# Patient Record
Sex: FEMALE | Race: WHITE | NOT HISPANIC OR LATINO | Employment: UNEMPLOYED | URBAN - METROPOLITAN AREA
[De-identification: names, ages, dates, MRNs, and addresses within clinical notes are randomized per-mention and may not be internally consistent; named-entity substitution may affect disease eponyms.]

---

## 2023-04-25 NOTE — PROGRESS NOTES
Chief Complaint  Chief Complaint   Patient presents with   • Anxiety   • Back Pain   • COPD   • Depression   • Establish Care     New patient          Subjective          Geni Rea is here today to establish care. The following problems were discussed:       HLD-She reports sometimes it has been normal and sometimes it has been high. It fluctuates. She is currently not on any medications. She does not watch her diet.     GERD- Controlled on omeprazole.     Anxiety with depression- On medication. Feels like this is controlled for the most part. She previously saw a counselor in past.     Osteoporosis/chronic pain- Uses a cane daily. On fosamax and compliant. Goes to specialist in pain care, [place]. Chronic pain is lower/back hips area. Currently pain is 9/10. A lot of walking makes pain worse. Pain management helps some.     Asthma/COPD- Compliant on inhalers. She reports she gets some shortness of breath.     Seasonal allergies- Does not take any medication.     Vitamin D defiency- Have been diagnosed- had labs that go back and forth    Transgender-Male to female transgender. On shots and previously went through  of  endocrinology.      She lives in [place]   Previous PCP was Radha Serrano   Marital status- single  Children- No  Works as Unemployed   Exercise- irregularly  Diet- Eating too much junk food       Review of Systems   Constitutional: Negative for chills and fever.   HENT: Negative for congestion.    Eyes: Negative.    Respiratory: Positive for shortness of breath.    Cardiovascular: Negative for chest pain.   Gastrointestinal: Negative for abdominal pain, nausea and vomiting.   Genitourinary: Negative for difficulty urinating.   Musculoskeletal: Positive for arthralgias.   Skin: Negative.    Neurological: Negative for dizziness, light-headedness and headache.   Psychiatric/Behavioral: Positive for depressed mood. Negative for self-injury and suicidal ideas. The  "patient is nervous/anxious.         Objective   Vital Signs:   Vitals:    04/26/23 1315   BP: 130/77   Pulse: 65   Temp: 97.8 °F (36.6 °C)   SpO2: 98%      Estimated body mass index is 24.53 kg/m² as calculated from the following:    Height as of this encounter: 167.6 cm (66\").    Weight as of this encounter: 68.9 kg (152 lb).            Physical Exam  Vitals reviewed.   Constitutional:       Appearance: Normal appearance. She is normal weight.   HENT:      Head: Normocephalic and atraumatic.      Nose: Nose normal.   Eyes:      Extraocular Movements: Extraocular movements intact.      Conjunctiva/sclera: Conjunctivae normal.      Comments: Wears eye glasses    Cardiovascular:      Rate and Rhythm: Normal rate and regular rhythm.      Pulses: Normal pulses.      Heart sounds: Normal heart sounds.      Comments: S1, S2 audible    Pulmonary:      Effort: Pulmonary effort is normal.      Comments: On room air, diminished breath sounds noted throughout all lobes  Abdominal:      General: Abdomen is flat. Bowel sounds are normal.      Palpations: Abdomen is soft.   Musculoskeletal:         General: Normal range of motion.      Cervical back: Normal range of motion.   Skin:     General: Skin is warm and dry.      Comments: Tattoos bilateral arms   Neurological:      General: No focal deficit present.      Mental Status: She is alert and oriented to person, place, and time. Mental status is at baseline.   Psychiatric:         Mood and Affect: Mood normal.         Behavior: Behavior normal.         Thought Content: Thought content normal.         Judgment: Judgment normal.                Physical Exam   Result Review :             Procedures       Assessment and Plan     Diagnoses and all orders for this visit:    1. Seasonal allergic rhinitis, unspecified trigger (Primary)  Assessment & Plan:  Does not take any medication.       2. Hyperlipidemia, unspecified hyperlipidemia type  Assessment & Plan:  Previously on " atorvastatin   She reports sometimes it has been normal and sometimes it has been high. It fluctuates. She is currently not on any medications. She does not watch her diet.       3. Osteoporosis, unspecified osteoporosis type, unspecified pathological fracture presence  Assessment & Plan:  Continue Fosamax  Uses a cane daily. On fosamax and compliant. Goes to specialist in pain care, but [place]. Chronic pain is lower/back hips area. Currently pain is 9/10. A lot of walking makes pain worse. Pain management helps some.     Referral to pain management       4. Asthma, unspecified asthma severity, unspecified whether complicated, unspecified whether persistent  Assessment & Plan:  Continue albuterol and Symbicort    Compliant on inhalers. She reports she gets some shortness of breath.           5. Anxiety  Assessment & Plan:  Continue Vraylar and paxil    On medication. Feels like this is controlled for the most part. She previously saw a counselor in past.       6. Depressive disorder  Assessment & Plan:  Continue Vraylar and paxil    On medication. Feels like this is controlled for the most part. She previously saw a counselor in past. Denies SI or HI.      7. Tobacco user  Assessment & Plan:  Encourage cessation    Geni Rea  reports that she has been smoking cigarettes. She has a 18.00 pack-year smoking history. She has never used smokeless tobacco.. I have educated her on the risk of diseases from using tobacco products such as cancer, COPD and heart disease.     I advised her to quit and she is not willing to quit.    I spent 3  minutes counseling the patient.    Smokes 1/2 PPD           8. Chronic obstructive pulmonary disease, unspecified COPD type  Assessment & Plan:  Continue albuterol, spiriva, and Symbicort    Compliant on inhalers. She reports she gets some shortness of breath.           9. Gastroesophageal reflux disease without esophagitis  Assessment & Plan:  Controlled on  omeprazole.      10. Encounter to establish care    11. Arthritis  Assessment & Plan:  Uses a cane daily. On fosamax and compliant. Goes to specialist in pain care, but [place] a [place]. Chronic pain is lower/back hips area. Currently pain is 9/10. A lot of walking makes pain worse. Pain management helps some.     Referral to [facility]     Orders:  -     Ambulatory Referral to Pain Management    12. Vitamin D deficiency  Assessment & Plan:  Have been diagnosed- had labs that go back and forth      13. Transsexualism  Assessment & Plan:  Male to female transgender   On shots and previously went through [facility] endocr[facility]ology    Referral to endocrinology     Orders:  -     Ambulatory Referral to Endocrinology    Other orders  -     PARoxetine (PAXIL) 30 MG tablet; Take 1 tablet by mouth every night at bedtime.  Dispense: 90 tablet; Refill: 2  -     Cariprazine HCl (Vraylar) 1.5 MG capsule capsule; Take 1 capsule by mouth Daily.  Dispense: 90 capsule; Refill: 2  -     alendronate (FOSAMAX) 70 MG tablet; Take 1 tablet by mouth Every 7 (Seven) Days.  Dispense: 4 tablet; Refill: 11  -     albuterol sulfate  (90 Base) MCG/ACT inhaler; Inhale 2 puffs Every 4 (Four) Hours As Needed for Wheezing.  Dispense: 18 g; Refill: 3  -     methocarbamol (ROBAXIN) 750 MG tablet; Take 1 tablet by mouth 4 (Four) Times a Day As Needed for Muscle Spasms.  Dispense: 90 tablet; Refill: 2  -     ibuprofen (ADVIL,MOTRIN) 800 MG tablet; Take 1 tablet by mouth Every 12 (Twelve) Hours.  Dispense: 90 tablet; Refill: 2  -     omeprazole (priLOSEC) 40 MG capsule; Take 1 capsule by mouth Daily.  Dispense: 90 capsule; Refill: 3            Follow Up   Return in about 7 months (around 11/26/2023) for Recheck- lipid panel, vitamin D, possible annual physical- patient o verify with previous PCP.   Patient was given instructions and counseling regarding her condition or for health maintenance advice. Please see specific  information pulled into the AVS if appropriate.      no

## 2023-04-26 ENCOUNTER — OFFICE VISIT (OUTPATIENT)
Dept: FAMILY MEDICINE CLINIC | Facility: CLINIC | Age: 53
End: 2023-04-26
Payer: MEDICAID

## 2023-04-26 VITALS
BODY MASS INDEX: 24.43 KG/M2 | TEMPERATURE: 97.8 F | SYSTOLIC BLOOD PRESSURE: 130 MMHG | WEIGHT: 152 LBS | HEART RATE: 65 BPM | DIASTOLIC BLOOD PRESSURE: 77 MMHG | HEIGHT: 66 IN | OXYGEN SATURATION: 98 %

## 2023-04-26 DIAGNOSIS — M81.0 OSTEOPOROSIS, UNSPECIFIED OSTEOPOROSIS TYPE, UNSPECIFIED PATHOLOGICAL FRACTURE PRESENCE: ICD-10-CM

## 2023-04-26 DIAGNOSIS — J44.9 CHRONIC OBSTRUCTIVE PULMONARY DISEASE, UNSPECIFIED COPD TYPE: ICD-10-CM

## 2023-04-26 DIAGNOSIS — E78.5 HYPERLIPIDEMIA, UNSPECIFIED HYPERLIPIDEMIA TYPE: ICD-10-CM

## 2023-04-26 DIAGNOSIS — F32.A DEPRESSIVE DISORDER: ICD-10-CM

## 2023-04-26 DIAGNOSIS — E55.9 VITAMIN D DEFICIENCY: ICD-10-CM

## 2023-04-26 DIAGNOSIS — J45.909 ASTHMA, UNSPECIFIED ASTHMA SEVERITY, UNSPECIFIED WHETHER COMPLICATED, UNSPECIFIED WHETHER PERSISTENT: ICD-10-CM

## 2023-04-26 DIAGNOSIS — M19.90 ARTHRITIS: ICD-10-CM

## 2023-04-26 DIAGNOSIS — Z72.0 TOBACCO USER: ICD-10-CM

## 2023-04-26 DIAGNOSIS — Z76.89 ENCOUNTER TO ESTABLISH CARE: ICD-10-CM

## 2023-04-26 DIAGNOSIS — F41.9 ANXIETY: ICD-10-CM

## 2023-04-26 DIAGNOSIS — F64.0 TRANSSEXUALISM: ICD-10-CM

## 2023-04-26 DIAGNOSIS — J30.2 SEASONAL ALLERGIC RHINITIS, UNSPECIFIED TRIGGER: Primary | ICD-10-CM

## 2023-04-26 DIAGNOSIS — K21.9 GASTROESOPHAGEAL REFLUX DISEASE WITHOUT ESOPHAGITIS: ICD-10-CM

## 2023-04-26 PROBLEM — B19.10 TYPE B VIRAL HEPATITIS: Status: ACTIVE | Noted: 2021-07-23

## 2023-04-26 PROBLEM — K58.9 IRRITABLE BOWEL SYNDROME: Status: ACTIVE | Noted: 2020-12-07

## 2023-04-26 PROBLEM — F64.9 GENDER DYSPHORIA: Status: ACTIVE | Noted: 2019-12-05

## 2023-04-26 RX ORDER — PAROXETINE 30 MG/1
30 TABLET, FILM COATED ORAL
COMMUNITY
Start: 2023-04-10 | End: 2023-04-26 | Stop reason: SDUPTHER

## 2023-04-26 RX ORDER — ALENDRONATE SODIUM 70 MG/1
70 TABLET ORAL
COMMUNITY
Start: 2023-01-31 | End: 2023-04-26 | Stop reason: SDUPTHER

## 2023-04-26 RX ORDER — ALBUTEROL SULFATE 90 UG/1
2 AEROSOL, METERED RESPIRATORY (INHALATION) EVERY 4 HOURS PRN
Qty: 18 G | Refills: 3 | Status: SHIPPED | OUTPATIENT
Start: 2023-04-26

## 2023-04-26 RX ORDER — RANITIDINE 150 MG/1
150 TABLET ORAL
COMMUNITY
End: 2023-04-26

## 2023-04-26 RX ORDER — METHOCARBAMOL 750 MG/1
750 TABLET, FILM COATED ORAL 4 TIMES DAILY PRN
COMMUNITY
End: 2023-04-26 | Stop reason: SDUPTHER

## 2023-04-26 RX ORDER — OMEPRAZOLE 40 MG/1
40 CAPSULE, DELAYED RELEASE ORAL DAILY
COMMUNITY
End: 2023-04-26 | Stop reason: SDUPTHER

## 2023-04-26 RX ORDER — ESTRADIOL VALERATE 20 MG/ML
5 INJECTION INTRAMUSCULAR
COMMUNITY
Start: 2023-01-13 | End: 2023-07-12

## 2023-04-26 RX ORDER — CEFDINIR 300 MG/1
1 CAPSULE ORAL EVERY 12 HOURS SCHEDULED
COMMUNITY
Start: 2023-01-09 | End: 2023-04-26

## 2023-04-26 RX ORDER — ATORVASTATIN CALCIUM 10 MG/1
10 TABLET, FILM COATED ORAL DAILY
Qty: 30 TABLET | Refills: 11 | COMMUNITY
Start: 2023-01-13 | End: 2023-04-26

## 2023-04-26 RX ORDER — EMTRICITABINE AND TENOFOVIR DISOPROXIL FUMARATE 200; 300 MG/1; MG/1
1 TABLET, FILM COATED ORAL DAILY
COMMUNITY
End: 2023-04-26

## 2023-04-26 RX ORDER — PAROXETINE 30 MG/1
30 TABLET, FILM COATED ORAL
Qty: 90 TABLET | Refills: 2 | Status: SHIPPED | OUTPATIENT
Start: 2023-04-26

## 2023-04-26 RX ORDER — BUDESONIDE AND FORMOTEROL FUMARATE DIHYDRATE 160; 4.5 UG/1; UG/1
2 AEROSOL RESPIRATORY (INHALATION)
COMMUNITY

## 2023-04-26 RX ORDER — HYDROCODONE BITARTRATE AND ACETAMINOPHEN 10; 325 MG/1; MG/1
1 TABLET ORAL 3 TIMES DAILY PRN
COMMUNITY
Start: 2023-04-05

## 2023-04-26 RX ORDER — ALBUTEROL SULFATE 90 UG/1
2 AEROSOL, METERED RESPIRATORY (INHALATION)
COMMUNITY
End: 2023-04-26 | Stop reason: SDUPTHER

## 2023-04-26 RX ORDER — IBUPROFEN 800 MG/1
800 TABLET ORAL EVERY 12 HOURS SCHEDULED
Qty: 90 TABLET | Refills: 2 | Status: SHIPPED | OUTPATIENT
Start: 2023-04-26

## 2023-04-26 RX ORDER — NAPROXEN 500 MG/1
TABLET ORAL
COMMUNITY
End: 2023-04-26

## 2023-04-26 RX ORDER — UBIDECARENONE 75 MG
50 CAPSULE ORAL DAILY
COMMUNITY
End: 2023-04-26

## 2023-04-26 RX ORDER — UMECLIDINIUM BROMIDE AND VILANTEROL TRIFENATATE 62.5; 25 UG/1; UG/1
1 POWDER RESPIRATORY (INHALATION) DAILY
COMMUNITY
End: 2023-04-26

## 2023-04-26 RX ORDER — IBUPROFEN 800 MG/1
1 TABLET ORAL EVERY 12 HOURS SCHEDULED
COMMUNITY
Start: 2023-04-05 | End: 2023-04-26 | Stop reason: SDUPTHER

## 2023-04-26 RX ORDER — SERTRALINE HYDROCHLORIDE 100 MG/1
100 TABLET, FILM COATED ORAL DAILY
COMMUNITY
End: 2023-04-26

## 2023-04-26 RX ORDER — ALENDRONATE SODIUM 70 MG/1
70 TABLET ORAL
Qty: 4 TABLET | Refills: 11 | Status: SHIPPED | OUTPATIENT
Start: 2023-04-26 | End: 2024-04-25

## 2023-04-26 RX ORDER — ESTRADIOL 2 MG/1
3 TABLET ORAL
COMMUNITY

## 2023-04-26 RX ORDER — METHOCARBAMOL 750 MG/1
750 TABLET, FILM COATED ORAL 4 TIMES DAILY PRN
Qty: 90 TABLET | Refills: 2 | Status: SHIPPED | OUTPATIENT
Start: 2023-04-26

## 2023-04-26 RX ORDER — OMEPRAZOLE 40 MG/1
40 CAPSULE, DELAYED RELEASE ORAL DAILY
Qty: 90 CAPSULE | Refills: 3 | Status: SHIPPED | OUTPATIENT
Start: 2023-04-26

## 2023-04-26 RX ORDER — TIOTROPIUM BROMIDE AND OLODATEROL 3.124; 2.736 UG/1; UG/1
SPRAY, METERED RESPIRATORY (INHALATION)
COMMUNITY
End: 2023-04-26

## 2023-04-26 NOTE — ASSESSMENT & PLAN NOTE
Continue albuterol and Symbicort    Compliant on inhalers. She reports she gets some shortness of breath.

## 2023-04-26 NOTE — PATIENT INSTRUCTIONS
Re-filled medications  Referral to pain management [facility]  Referral to Endocrinology- Dr. Villalta

## 2023-04-26 NOTE — ASSESSMENT & PLAN NOTE
Continue albuterol, spiriva, and Symbicort    Compliant on inhalers. She reports she gets some shortness of breath.

## 2023-04-26 NOTE — ASSESSMENT & PLAN NOTE
Male to female transgender   On shots and previously went through [facility] endocrinology    Referral to endocrinology

## 2023-04-26 NOTE — ASSESSMENT & PLAN NOTE
Continue Vraylar and paxil    On medication. Feels like this is controlled for the most part. She previously saw a counselor in past.

## 2023-04-26 NOTE — ASSESSMENT & PLAN NOTE
Continue Vraylar and paxil    On medication. Feels like this is controlled for the most part. She previously saw a counselor in past. Denies SI or HI.

## 2023-04-26 NOTE — ASSESSMENT & PLAN NOTE
Previously on atorvastatin   She reports sometimes it has been normal and sometimes it has been high. It fluctuates. She is currently not on any medications. She does not watch her diet.

## 2023-04-26 NOTE — ASSESSMENT & PLAN NOTE
Continue Fosamax  Uses a cane daily. On fosamax and compliant. Goes to specialist in pain care, but [place] a [place]. Chronic pain is lower/back hips area. Currently pain is 9/10. A lot of walking makes pain worse. Pain management helps some.     Referral to pain management

## 2023-04-26 NOTE — ASSESSMENT & PLAN NOTE
Encourage cessation    eGni Rea  reports that she has been smoking cigarettes. She has a 18.00 pack-year smoking history. She has never used smokeless tobacco.. I have educated her on the risk of diseases from using tobacco products such as cancer, COPD and heart disease.     I advised her to quit and she is not willing to quit.    I spent 3  minutes counseling the patient.    Smokes 1/2 PPD

## 2023-05-08 ENCOUNTER — OFFICE VISIT (OUTPATIENT)
Dept: PAIN MEDICINE | Facility: CLINIC | Age: 53
End: 2023-05-08
Payer: MEDICAID

## 2023-05-08 ENCOUNTER — TELEPHONE (OUTPATIENT)
Dept: PAIN MEDICINE | Facility: CLINIC | Age: 53
End: 2023-05-08

## 2023-05-08 ENCOUNTER — HOSPITAL ENCOUNTER (OUTPATIENT)
Dept: GENERAL RADIOLOGY | Facility: HOSPITAL | Age: 53
Discharge: HOME | End: 2023-05-08
Payer: MEDICAID

## 2023-05-08 VITALS
DIASTOLIC BLOOD PRESSURE: 69 MMHG | RESPIRATION RATE: 16 BRPM | SYSTOLIC BLOOD PRESSURE: 124 MMHG | HEART RATE: 57 BPM | OXYGEN SATURATION: 96 %

## 2023-05-08 DIAGNOSIS — F11.20 NARCOTIC DEPENDENCE: ICD-10-CM

## 2023-05-08 DIAGNOSIS — M51.36 DDD (DEGENERATIVE DISC DISEASE), LUMBAR: ICD-10-CM

## 2023-05-08 DIAGNOSIS — M54.16 LUMBAR RADICULOPATHY: Primary | ICD-10-CM

## 2023-05-08 DIAGNOSIS — Z79.899 HIGH RISK MEDICATION USE: Primary | ICD-10-CM

## 2023-05-08 DIAGNOSIS — M54.16 LUMBAR RADICULOPATHY: ICD-10-CM

## 2023-05-08 PROCEDURE — 72100 X-RAY EXAM L-S SPINE 2/3 VWS: CPT

## 2023-05-08 PROCEDURE — 99204 OFFICE O/P NEW MOD 45 MIN: CPT | Performed by: STUDENT IN AN ORGANIZED HEALTH CARE EDUCATION/TRAINING PROGRAM

## 2023-05-08 PROCEDURE — 1160F RVW MEDS BY RX/DR IN RCRD: CPT | Performed by: STUDENT IN AN ORGANIZED HEALTH CARE EDUCATION/TRAINING PROGRAM

## 2023-05-08 PROCEDURE — 1159F MED LIST DOCD IN RCRD: CPT | Performed by: STUDENT IN AN ORGANIZED HEALTH CARE EDUCATION/TRAINING PROGRAM

## 2023-05-08 PROCEDURE — 1125F AMNT PAIN NOTED PAIN PRSNT: CPT | Performed by: STUDENT IN AN ORGANIZED HEALTH CARE EDUCATION/TRAINING PROGRAM

## 2023-05-08 RX ORDER — HYDROCODONE BITARTRATE AND ACETAMINOPHEN 10; 325 MG/1; MG/1
1 TABLET ORAL EVERY 8 HOURS PRN
Qty: 90 TABLET | Refills: 0 | Status: SHIPPED | OUTPATIENT
Start: 2023-05-08 | End: 2023-05-09 | Stop reason: SDUPTHER

## 2023-05-08 RX ORDER — METHOCARBAMOL 750 MG/1
750 TABLET, FILM COATED ORAL 4 TIMES DAILY PRN
Qty: 90 TABLET | Refills: 2 | Status: SHIPPED | OUTPATIENT
Start: 2023-05-08

## 2023-05-08 NOTE — TELEPHONE ENCOUNTER
Caller: Geni Rea    Relationship: Self    Best call back number:     Requested Prescriptions:   HYDROcodone-acetaminophen (NORCO)  MG per tablet        [facility]arm[facility] where [facility] be sent[facility]      Last office visit with prescribing clinician: 5/8/2023   Last telemedicine visit with prescribing clinician: 6/5/2023   Next office visit with prescribing clinician: 6/5/2023     Additional details provided by patient: PRIOR AUTH NEEDED    Does the patient have less than a 3 day supply:  [x] Yes  [] No    Would you like a call back once the refill request has been completed: [x] Yes [] No    If the office needs to give you a call back, can they leave a voicemail: [x] Yes [] No    Mohit Looney Rep   05/08/23 14:49 EDT

## 2023-05-08 NOTE — PROGRESS NOTES
CHIEF COMPLAINT  Chief Complaint   Patient presents with   • Arthritis     New patient referred for Arthritis Lower back  Treated w/Hydrocodone last dose was Thursday        Primary Care  Jaylyn Moscoso, LEONOR Sargent   Gein Rea is a 52 y.o. female  who presents to obtain narcotics.  She states that she has been taking narcotics for many years for chronic pain.  She reports she has a significant history of osteoarthritis as well as facet arthropathy and bilateral hip pain.  She reports she has tried physical therapy and interventions in the past which were not effective.  She gets minimal benefit with narcotics.  She has no imaging.  She recently moved and is transferring care.    History of Present Illness     Location: Low back, hips  Onset: Years ago  Duration: Worsening  Timing: Constant throughout the day  Quality: Sharp, stabbing  Severity: Today: 10       Last Week: 10       Worst: 10  Modifying Factors: The pain is worse with movement and physical activity and slightly improved with narcotics  Functional Deficit: The pain makes it difficult for her to work perform her activities of daily living    Physical Therapy: no    Interval Update 05/08/2023:     The following portions of the patient's history were reviewed and updated as appropriate: allergies, current medications, past family history, past medical history, past social history, past surgical history and problem list.      Current Outpatient Medications:   •  albuterol sulfate  (90 Base) MCG/ACT inhaler, Inhale 2 puffs Every 4 (Four) Hours As Needed for Wheezing., Disp: 18 g, Rfl: 3  •  alendronate (FOSAMAX) 70 MG tablet, Take 1 tablet by mouth Every 7 (Seven) Days., Disp: 4 tablet, Rfl: 11  •  budesonide-formoterol (SYMBICORT) 160-4.5 MCG/ACT inhaler, Inhale 2 puffs 2 (Two) Times a Day., Disp: , Rfl:   •  Cariprazine HCl (Vraylar) 1.5 MG capsule capsule, Take 1 capsule by mouth Daily., Disp: 90 capsule, Rfl: 2  •  Cholecalciferol  125 MCG (5000 UT) tablet, Take 1 tablet by mouth Daily., Disp: , Rfl:   •  estradiol (ESTRACE) 2 MG tablet, Take 1.5 tablets by mouth., Disp: , Rfl:   •  estradiol valerate (DELESTROGEN) 20 MG/ML injection, Inject 5 mg into the appropriate muscle as directed by prescriber., Disp: , Rfl:   •  HYDROcodone-acetaminophen (NORCO)  MG per tablet, Take 1 tablet by mouth Every 8 (Eight) Hours As Needed for Moderate Pain. for pain, Disp: 90 tablet, Rfl: 0  •  ibuprofen (ADVIL,MOTRIN) 800 MG tablet, Take 1 tablet by mouth Every 12 (Twelve) Hours., Disp: 90 tablet, Rfl: 2  •  methocarbamol (ROBAXIN) 750 MG tablet, Take 1 tablet by mouth 4 (Four) Times a Day As Needed for Muscle Spasms., Disp: 90 tablet, Rfl: 2  •  omeprazole (priLOSEC) 40 MG capsule, Take 1 capsule by mouth Daily., Disp: 90 capsule, Rfl: 3  •  PARoxetine (PAXIL) 30 MG tablet, Take 1 tablet by mouth every night at bedtime., Disp: 90 tablet, Rfl: 2  •  tiotropium (SPIRIVA) 18 MCG per inhalation capsule, Place 1 capsule into inhaler and inhale Daily., Disp: , Rfl:     Review of Systems   Musculoskeletal: Positive for arthralgias, back pain, gait problem, joint swelling and myalgias. Negative for neck pain and neck stiffness.   Neurological: Negative for weakness and numbness.       Vitals:    05/08/23 0805   BP: 124/69   Pulse: 57   Resp: 16   SpO2: 96%   PainSc: 10-Worst pain ever       Urine Drug Screen: 5/8/2023  Appropriate: Pending    Objective   Physical Exam  Vitals and nursing note reviewed.   Constitutional:       General: She is not in acute distress.     Appearance: Normal appearance. She is normal weight.   Musculoskeletal:      Lumbar back: Tenderness present. Decreased range of motion.   Neurological:      Mental Status: She is alert.           Assessment & Plan   Problems Addressed this Visit    None  Visit Diagnoses     Lumbar radiculopathy    -  Primary    Relevant Medications    HYDROcodone-acetaminophen (NORCO)  MG per tablet     Other Relevant Orders    XR Spine Lumbar 2 or 3 View    DDD (degenerative disc disease), lumbar        Relevant Medications    HYDROcodone-acetaminophen (NORCO)  MG per tablet    Other Relevant Orders    XR Spine Lumbar 2 or 3 View    Narcotic dependence          Diagnoses       Codes Comments    Lumbar radiculopathy    -  Primary ICD-10-CM: M54.16  ICD-9-CM: 724.4     DDD (degenerative disc disease), lumbar     ICD-10-CM: M51.36  ICD-9-CM: 722.52     Narcotic dependence     ICD-10-CM: F11.20  ICD-9-CM: 304.90           Plan:  1. She is requesting hydrocodone 10 mg 3 times daily.  We will refill per patient request.  She reportedly has a history of facet arthropathy as well as severe osteoarthritis in the low back.  I have no x-rays or other labs to substantiate this claim  2. Continue methocarbamol 750 mg 3 times daily  3. UDS and contract today  4. Lumbar plain film  --- Follow-up 1 month           INSPECT REPORT    As part of the patient's treatment plan, I may be prescribing controlled substances. The patient has been made aware of appropriate use of such medications, including potential risk of somnolence, limited ability to drive and/or work safely, and the potential for dependence or overdose. It has also bee made clear that these medications are for use by this patient only, without concomitant use of alcohol or other substances unless prescribed.     Patient has completed prescribing agreement detailing terms of continued prescribing of controlled substances, including monitoring EMMA reports, urine drug screening, and pill counts if necessary. The patient is aware that inappropriate use will results in cessation of prescribing such medications.    INSPECT report has been reviewed and scanned into the patient's chart.    As the clinician, I personally reviewed the INSPECT from 5/5/2023.    History and physical exam exhibit continued safe and appropriate use of controlled substances.      EMR  Dragon/Transcription disclaimer:   Much of this encounter note is an electronic transcription/translation of spoken language to printed text. The electronic translation of spoken language may permit erroneous, or at times, nonsensical words or phrases to be inadvertently transcribed; Although I have reviewed the note for such errors, some may still exist.

## 2023-05-09 DIAGNOSIS — M54.16 LUMBAR RADICULOPATHY: ICD-10-CM

## 2023-05-09 DIAGNOSIS — M51.36 DDD (DEGENERATIVE DISC DISEASE), LUMBAR: ICD-10-CM

## 2023-05-09 NOTE — TELEPHONE ENCOUNTER
ATTEMPTED TO WARM TRANSFER    PATIENT CALLED STATING [facility] SAYS OFFICE NEEDS TO SEND THEM INSURANCE DENIAL FOR NORCO SO PATIENT CAN GET RX AND PAY OUT OF POCKET.

## 2023-05-10 RX ORDER — HYDROCODONE BITARTRATE AND ACETAMINOPHEN 10; 325 MG/1; MG/1
1 TABLET ORAL EVERY 8 HOURS PRN
Qty: 21 TABLET | Refills: 0 | Status: SHIPPED | OUTPATIENT
Start: 2023-05-10

## 2023-05-10 NOTE — TELEPHONE ENCOUNTER
Caller: Geni Rea    Relationship to patient: Self    Best call back number: [phone]    Patient is needing: PATIENT IS CALLING BACK TO DISCUSS THE AUTHORIZATION FOR HER HYDROCODONE.  SHE IS STILL NOT ABLE TO RETRIEVE FROM PHARMACY.  PATIENT WOULD LIKE A CALL BACK. UNABLE TO WARM TRANSFER.

## 2023-05-22 ENCOUNTER — LAB (OUTPATIENT)
Dept: LAB | Facility: HOSPITAL | Age: 53
End: 2023-05-22
Payer: MEDICAID

## 2023-05-22 ENCOUNTER — TRANSCRIBE ORDERS (OUTPATIENT)
Dept: ADMINISTRATIVE | Facility: HOSPITAL | Age: 53
End: 2023-05-22
Payer: MEDICAID

## 2023-05-22 ENCOUNTER — HOSPITAL ENCOUNTER (OUTPATIENT)
Dept: CARDIOLOGY | Facility: HOSPITAL | Age: 53
Discharge: HOME | End: 2023-05-22
Payer: MEDICAID

## 2023-05-22 DIAGNOSIS — Z01.818 PRE-OP TESTING: ICD-10-CM

## 2023-05-22 DIAGNOSIS — Z01.818 PRE-OP TESTING: Primary | ICD-10-CM

## 2023-05-22 LAB
ALBUMIN SERPL-MCNC: 4.5 G/DL (ref 3.5–5.2)
ALBUMIN/GLOB SERPL: 1.9 G/DL
ALP SERPL-CCNC: 70 U/L (ref 39–117)
ALT SERPL W P-5'-P-CCNC: 32 U/L (ref 1–33)
ANION GAP SERPL CALCULATED.3IONS-SCNC: 9 MMOL/L (ref 5–15)
APTT PPP: 26.5 SECONDS (ref 24–31)
AST SERPL-CCNC: 26 U/L (ref 1–32)
BILIRUB SERPL-MCNC: 0.3 MG/DL (ref 0–1.2)
BUN SERPL-MCNC: 11 MG/DL (ref 6–20)
BUN/CREAT SERPL: 15.9 (ref 7–25)
CALCIUM SPEC-SCNC: 9.3 MG/DL (ref 8.6–10.5)
CHLORIDE SERPL-SCNC: 101 MMOL/L (ref 98–107)
CO2 SERPL-SCNC: 29 MMOL/L (ref 22–29)
CREAT SERPL-MCNC: 0.69 MG/DL (ref 0.57–1)
DEPRECATED RDW RBC AUTO: 48.1 FL (ref 37–54)
EGFRCR SERPLBLD CKD-EPI 2021: 103.9 ML/MIN/1.73
ERYTHROCYTE [DISTWIDTH] IN BLOOD BY AUTOMATED COUNT: 14.2 % (ref 12.3–15.4)
FIBRINOGEN PPP-MCNC: 230 MG/DL (ref 210–450)
GLOBULIN UR ELPH-MCNC: 2.4 GM/DL
GLUCOSE SERPL-MCNC: 93 MG/DL (ref 65–99)
HCT VFR BLD AUTO: 37.5 % (ref 34–46.6)
HGB BLD-MCNC: 12.6 G/DL (ref 12–15.9)
INR PPP: 0.95 (ref 0.93–1.1)
LYMPHOCYTES # BLD MANUAL: 1.34 10*3/MM3 (ref 0.7–3.1)
LYMPHOCYTES NFR BLD MANUAL: 3 % (ref 5–12)
MCH RBC QN AUTO: 31.3 PG (ref 26.6–33)
MCHC RBC AUTO-ENTMCNC: 33.6 G/DL (ref 31.5–35.7)
MCV RBC AUTO: 92.9 FL (ref 79–97)
MONOCYTES # BLD: 0.25 10*3/MM3 (ref 0.1–0.9)
NEUTROPHILS # BLD AUTO: 6.8 10*3/MM3 (ref 1.7–7)
NEUTROPHILS NFR BLD MANUAL: 81 % (ref 42.7–76)
PLAT MORPH BLD: NORMAL
PLATELET # BLD AUTO: 171 10*3/MM3 (ref 140–450)
PMV BLD AUTO: 8.2 FL (ref 6–12)
POTASSIUM SERPL-SCNC: 4.9 MMOL/L (ref 3.5–5.2)
PROT SERPL-MCNC: 6.9 G/DL (ref 6–8.5)
PROTHROMBIN TIME: 10.2 SECONDS (ref 9.6–11.7)
QT INTERVAL: 414 MS
RBC # BLD AUTO: 4.03 10*6/MM3 (ref 3.77–5.28)
RBC MORPH BLD: NORMAL
SCAN SLIDE: NORMAL
SODIUM SERPL-SCNC: 139 MMOL/L (ref 136–145)
VARIANT LYMPHS NFR BLD MANUAL: 16 % (ref 19.6–45.3)
WBC MORPH BLD: NORMAL
WBC NRBC COR # BLD: 8.4 10*3/MM3 (ref 3.4–10.8)

## 2023-05-22 PROCEDURE — 85610 PROTHROMBIN TIME: CPT

## 2023-05-22 PROCEDURE — 80305 DRUG TEST PRSMV DIR OPT OBS: CPT

## 2023-05-22 PROCEDURE — 93005 ELECTROCARDIOGRAM TRACING: CPT | Performed by: UROLOGY

## 2023-05-22 PROCEDURE — 80053 COMPREHEN METABOLIC PANEL: CPT

## 2023-05-22 PROCEDURE — 36415 COLL VENOUS BLD VENIPUNCTURE: CPT

## 2023-05-22 PROCEDURE — 85384 FIBRINOGEN ACTIVITY: CPT

## 2023-05-22 PROCEDURE — 85670 THROMBIN TIME PLASMA: CPT

## 2023-05-22 PROCEDURE — 85730 THROMBOPLASTIN TIME PARTIAL: CPT

## 2023-05-22 PROCEDURE — 85025 COMPLETE CBC W/AUTO DIFF WBC: CPT

## 2023-05-22 PROCEDURE — 85007 BL SMEAR W/DIFF WBC COUNT: CPT

## 2023-05-23 ENCOUNTER — TELEPHONE (OUTPATIENT)
Dept: FAMILY MEDICINE CLINIC | Facility: CLINIC | Age: 53
End: 2023-05-23
Payer: MEDICAID

## 2023-05-23 RX ORDER — ESTRADIOL VALERATE 20 MG/ML
5 INJECTION INTRAMUSCULAR
OUTPATIENT
Start: 2023-05-23 | End: 2023-11-19

## 2023-05-23 RX ORDER — ALBUTEROL SULFATE 2.5 MG/.5ML
2.5 SOLUTION RESPIRATORY (INHALATION) 2 TIMES DAILY PRN
Qty: 20 ML | Refills: 2 | Status: SHIPPED | OUTPATIENT
Start: 2023-05-23 | End: 2023-05-23 | Stop reason: SDUPTHER

## 2023-05-23 RX ORDER — ALBUTEROL SULFATE 2.5 MG/.5ML
2.5 SOLUTION RESPIRATORY (INHALATION) 2 TIMES DAILY PRN
Qty: 3 ML | Refills: 2 | Status: SHIPPED | OUTPATIENT
Start: 2023-05-23

## 2023-05-23 RX ORDER — ALBUTEROL SULFATE 2.5 MG/.5ML
2.5 SOLUTION RESPIRATORY (INHALATION) 2 TIMES DAILY PRN
COMMUNITY
End: 2023-05-23 | Stop reason: SDUPTHER

## 2023-05-23 NOTE — TELEPHONE ENCOUNTER
Rx Refill Note  Requested Prescriptions     Pending Prescriptions Disp Refills   • estradiol valerate (DELESTROGEN) 20 MG/ML injection       Sig: Inject 0.25 mL into the appropriate muscle as directed by prescriber.      Last office visit with prescribing clinician: 4/26/2023   Last telemedicine visit with prescribing clinician: Visit date not found   Next office visit with prescribing clinician: 11/29/2023     CBC & Differential (05/22/2023 11:43)  Comprehensive Metabolic Panel (05/22/2023 11:43)                      Would you like a call back once the refill request has been completed: [] Yes [] No    If the office needs to give you a call back, can they leave a voicemail: [] Yes [] No    Florence Oliver MA  05/23/23, 11:05 EDT

## 2023-05-23 NOTE — TELEPHONE ENCOUNTER
Caller: [id number] - [place]  [address] [facility] [address] [place] [address] - [phone]  - [phone] FX    Relationship: Pharmacy    Best call back number: [phone]      PHARMACY NEEDS TO CONFIRM THE FOLLOWING PRESCRIPTION:    albuterol (PROVENTIL) 2.5 MG/0.5ML nebulizer solution    DID WE MEAN TO CALL IN THE ALBUTEROL 2.5MG/3 ML VILES ?      PLEASE ADVISE

## 2023-05-23 NOTE — TELEPHONE ENCOUNTER
Pt states the earliest she can get in w Endo is in Dec.   She also needs refill of albuterol for nebulizer, please.

## 2023-05-23 NOTE — TELEPHONE ENCOUNTER
Geni Rea notified and voiced comprehension and understanding, and will contact previous doctor to order refill on estradiol valerate.     Please just refill the Albuterol (Proventil).        Florence Oliver MA         Rx Refill Note  Requested Prescriptions     Pending Prescriptions Disp Refills   • estradiol valerate (DELESTROGEN) 20 MG/ML injection       Sig: Inject 0.25 mL into the appropriate muscle as directed by prescriber.   • albuterol (PROVENTIL) 2.5 MG/0.5ML nebulizer solution       Sig: Take 2.5 mg by nebulization 2 (Two) Times a Day As Needed for Wheezing or Shortness of Air.  2x dayprn      Last office visit with prescribing clinician: 4/26/2023   Last telemedicine visit with prescribing clinician: Visit date not found   Next office visit with prescribing clinician: 11/29/2023                         Would you like a call back once the refill request has been completed: [] Yes [] No    If the office needs to give you a call back, can they leave a voicemail: [] Yes [] No    Florence Oliver MA  05/23/23, 13:01 EDT

## 2023-05-24 ENCOUNTER — TELEPHONE (OUTPATIENT)
Dept: PAIN MEDICINE | Facility: CLINIC | Age: 53
End: 2023-05-24
Payer: MEDICAID

## 2023-05-24 DIAGNOSIS — M54.16 LUMBAR RADICULOPATHY: ICD-10-CM

## 2023-05-24 DIAGNOSIS — M51.36 DDD (DEGENERATIVE DISC DISEASE), LUMBAR: ICD-10-CM

## 2023-05-24 LAB
COTININE UR QL SCN: NEGATIVE NG/ML
Lab: NORMAL
THROMBIN TIME: 16.1 SEC (ref 0–23)

## 2023-05-24 RX ORDER — HYDROCODONE BITARTRATE AND ACETAMINOPHEN 10; 325 MG/1; MG/1
1 TABLET ORAL EVERY 8 HOURS PRN
Qty: 90 TABLET | Refills: 0 | Status: SHIPPED | OUTPATIENT
Start: 2023-05-24 | End: 2023-06-23

## 2023-05-24 NOTE — TELEPHONE ENCOUNTER
5/24/23-- FAXED HARD COPY PA TO PTS INS [employer] TO GET APPROVAL FOR #90 FOR 30 DAY SUPPLY OF HYDROC ACET [id number]

## 2023-05-24 NOTE — TELEPHONE ENCOUNTER
5/24/23-- DR SUNSHINE -- PT IS REQUESTING 30 DAY RX OF HYDROCODONE ACET -- SHE HAS FILLED 2-- 7 DAY RXES AND HER INS  IS STILL REQUIRING A  PA--  I CALLED AND TALKED TO HER INS AND THEY ARE SENDING NEW FORM FOR ME TO FILL OUT   FOR THE #30 DAY RX TO BE APPROVED-- WILL SEND LAST OFFICE NOTE AND  OTHER DOCUMENTS SHOWING ANY OTHER PREVIOUS TREATMENTS SINCE SHE IS A NEW PATIENT WITH NEW INS PLAN--    CAN YOU PLEASE SEND A 30 DAY RX TO HER PHARMACY AND I WILL START THE  PA PROCESS ON HARD COPY THIS TIME--  INSPECT IN CHART

## 2023-05-24 NOTE — TELEPHONE ENCOUNTER
5/24/23-- SPOKE TO PT--  HAS GONE THRU 2 ---7 DAY RXES-- NEEDS NEW 30 DAY RX-- CALLED HER INS SINCE DUE [place] KEEP DENYING MEDICATION-- THEY HAVE SENT ME A NEW FORM TO FILL OUT FOR HYDROCODONE ACET  AUTH FOR  INSURANCE APPROVAL-FYI-- ( TO ATTACH OFFICE  VISIT NOTES AND OTHER DOCUMENTATION OF MEDS AND ANY THERAPY SHE HAS HAD)

## 2023-05-24 NOTE — TELEPHONE ENCOUNTER
Caller: Geni Rea    Relationship to patient: Self    Best call back number: [phone]    Patient is needing: PT IS NEEDING HYDROCODONE PRE AUTH FOR 30 DAYS SUPPLIES TO BE SENT TO HER PREFERRED PHARMACY [facility] [phone]/ [phone].

## 2023-05-29 NOTE — PROGRESS NOTES
Encounter Date:05/30/2023        Patient ID: Geni Rea is a 52 y.o. female.      Chief Complaint:      History of Present Illness  52 years old very pleasant transgender woman presents to the office to establish care.  She has been referred to cardiology due to findings of abnormal ECG which showed sinus bradycardia with right bundle branch block with a QRS of more than 150 ms.  She denies any known cardiac history.  She has chronic pain for which she takes Norco.  She also has GERD, osteoarthritis, COPD.  She is a current smoker.    The following portions of the patient's history were reviewed and updated as appropriate: allergies, current medications, past family history, past medical history, past social history, past surgical history and problem list.    Review of Systems   Constitutional: Positive for malaise/fatigue.   Cardiovascular: Positive for palpitations. Negative for chest pain, dyspnea on exertion and leg swelling.   Respiratory: Positive for shortness of breath. Negative for cough.    Gastrointestinal: Negative for abdominal pain, nausea and vomiting.   Neurological: Positive for dizziness, light-headedness and numbness. Negative for focal weakness and headaches.   All other systems reviewed and are negative.        Current Outpatient Medications:   •  albuterol (PROVENTIL) 2.5 MG/0.5ML nebulizer solution, Take 2.5 mg by nebulization 2 (Two) Times a Day As Needed for Wheezing or Shortness of Air.  2x dayprn, Disp: 3 mL, Rfl: 2  •  albuterol sulfate  (90 Base) MCG/ACT inhaler, Inhale 2 puffs Every 4 (Four) Hours As Needed for Wheezing., Disp: 18 g, Rfl: 3  •  alendronate (FOSAMAX) 70 MG tablet, Take 1 tablet by mouth Every 7 (Seven) Days., Disp: 4 tablet, Rfl: 11  •  budesonide-formoterol (SYMBICORT) 160-4.5 MCG/ACT inhaler, Inhale 2 puffs 2 (Two) Times a Day., Disp: , Rfl:   •  Cariprazine HCl (Vraylar) 1.5 MG capsule capsule, Take 1 capsule by mouth Daily., Disp: 90 capsule, Rfl: 2  •   Cholecalciferol 125 MCG (5000 UT) tablet, Take 1 tablet by mouth Daily., Disp: , Rfl:   •  estradiol valerate (DELESTROGEN) 20 MG/ML injection, Inject 5 mg into the appropriate muscle as directed by prescriber., Disp: , Rfl:   •  HYDROcodone-acetaminophen (NORCO)  MG per tablet, Take 1 tablet by mouth Every 8 (Eight) Hours As Needed for Moderate Pain for up to 30 days. for pain, Disp: 90 tablet, Rfl: 0  •  ibuprofen (ADVIL,MOTRIN) 800 MG tablet, Take 1 tablet by mouth Every 12 (Twelve) Hours., Disp: 90 tablet, Rfl: 2  •  methocarbamol (ROBAXIN) 750 MG tablet, Take 1 tablet by mouth 4 (Four) Times a Day As Needed for Muscle Spasms., Disp: 90 tablet, Rfl: 2  •  omeprazole (priLOSEC) 40 MG capsule, Take 1 capsule by mouth Daily., Disp: 90 capsule, Rfl: 3  •  PARoxetine (PAXIL) 30 MG tablet, Take 1 tablet by mouth every night at bedtime., Disp: 90 tablet, Rfl: 2  •  tiotropium (SPIRIVA) 18 MCG per inhalation capsule, Place 1 capsule into inhaler and inhale Daily., Disp: , Rfl:     Current outpatient and discharge medications have been reconciled for the patient.  Reviewed by: Darien Ramos MD       Allergies   Allergen Reactions   • Bupropion Itching, Shortness Of Breath and Swelling     Felt like throat was swelling/closing up  Other reaction(s): Throat symptom  Category: Allergy; Annotation - 16Mar2017: Pt described throat tightness and issues breathing when taking wellbutrin  Other reaction(s): Throat symptom  Category: Allergy; Annotation - 16Mar2017: Pt described throat tightness and issues breathing when taking wellbutrin     • Penicillins Other (See Comments)     Unknown reaction as child  Category: unknown reaction; Annotation - 16Mar2017: Pt told she had a PCN allergy as a child but is unsure of the reaction  unknown reaction from childhood  unknown reaction from childhood  Category: unknown reaction; Annotation - 16Mar2017: Pt told she had a PCN allergy as a child but is unsure of the reaction  unknown  "reaction from childhood         Family History   Problem Relation Age of Onset   • Hypertension Mother    • Hypertension Father    • Kidney disease Father    • Cancer Father    • Diabetes Father    • Diabetes Paternal Grandmother    • Other Paternal Grandfather         emphazema       Past Surgical History:   Procedure Laterality Date   • TONSILLECTOMY  1976       Past Medical History:   Diagnosis Date   • Anxiety 2007   • Arthritis 2014   • Asthma 2016   • COPD (chronic obstructive pulmonary disease) 2016   • Depression 2007   • GERD (gastroesophageal reflux disease) 2007   • Heart murmur 2021   • Irritable bowel syndrome 2001   • Low back pain 2015   • Osteopenia 2014   • Scoliosis 2014   • Visual impairment 1989       Family History   Problem Relation Age of Onset   • Hypertension Mother    • Hypertension Father    • Kidney disease Father    • Cancer Father    • Diabetes Father    • Diabetes Paternal Grandmother    • Other Paternal Grandfather         emphazema       Social History     Socioeconomic History   • Marital status: Single   Tobacco Use   • Smoking status: Some Days     Packs/day: 0.50     Years: 36.00     Pack years: 18.00     Types: Cigarettes   • Smokeless tobacco: Never   Vaping Use   • Vaping Use: Never used   Substance and Sexual Activity   • Alcohol use: Not Currently     Alcohol/week: 3.0 standard drinks     Types: 3 Drinks containing 0.5 oz of alcohol per week   • Drug use: Never   • Sexual activity: Yes     Partners: Male     Birth control/protection: Condom, None               Objective:       Physical Exam    /72 (BP Location: Left arm, Patient Position: Sitting, Cuff Size: Adult)   Pulse 76   Ht 167.6 cm (66\")   Wt 70.3 kg (155 lb)   SpO2 95%   BMI 25.02 kg/m²   The patient is alert, oriented and in no distress.    Vital signs as noted above.    Head and neck revealed no carotid bruits or jugular venous distension.  No thyromegaly or lymphadenopathy is present.    Lungs clear.  " No wheezing.  Breath sounds are normal bilaterally.    Heart normal first and second heart sounds.  Midsystolic murmur.   No pericardial rub is present.  No gallop is present.    Abdomen soft and nontender.  No organomegaly is present.    Extremities revealed good peripheral pulses without any pedal edema.    Skin warm and dry.    Musculoskeletal system is grossly normal.    CNS grossly normal.           Diagnosis Plan   1. Abnormal ECG  Adult Transthoracic Echo Complete W/ Cont if Necessary Per Protocol      2. Chronic obstructive pulmonary disease, unspecified COPD type        3. Tobacco user        4. Depressive disorder        5. Gastroesophageal reflux disease without esophagitis        6. Osteoporosis, unspecified osteoporosis type, unspecified pathological fracture presence        7. Encounter for preventive care  Adult Transthoracic Echo Complete W/ Cont if Necessary Per Protocol    Lipid Panel    Hemoglobin A1c      8. SOB (shortness of breath)  Adult Transthoracic Echo Complete W/ Cont if Necessary Per Protocol      LAB RESULTS (LAST 7 DAYS)    CBC        BMP        CMP         BNP        TROPONIN        CoAg        Creatinine Clearance  Estimated Creatinine Clearance: 104.6 mL/min (by C-G formula based on SCr of 0.69 mg/dL).    ABG        Radiology  No radiology results for the last day    EKG  Procedures    Stress test      Echocardiogram      Cardiac catheterization  No results found for this or any previous visit.          Assessment and Plan       Diagnoses and all orders for this visit:    1. Abnormal ECG (Primary)  She has sinus bradycardia and right bundle branch block with a rather prolonged QRS of more than 150 ms.  I will obtain an echocardiogram to evaluate LV function.  She also has a murmur  2. Chronic obstructive pulmonary disease, unspecified COPD type  Continue bronchodilators  She will see pulmonology for work-up of COPD  3. Tobacco user  Smoking cessation counseling provided to the  patient.  She is due to see pulmonology for work-up of COPD  4. Depressive disorder  She currently takes paroxetine  5. Gastroesophageal reflux disease without esophagitis  Continue omeprazole  6. Osteoporosis, unspecified osteoporosis type, unspecified pathological fracture presence  On Fosamax  7.  Encounter for preventive care  I recommend her to obtain a lipid panel, A1c.  Encouraged aerobic exercises, discussed cardiac diet and healthy lifestyle changes.  I have also encouraged her to take an aspirin a day because she is on estradiol therapy.

## 2023-05-30 ENCOUNTER — LAB (OUTPATIENT)
Dept: LAB | Facility: HOSPITAL | Age: 53
End: 2023-05-30

## 2023-05-30 ENCOUNTER — OFFICE VISIT (OUTPATIENT)
Dept: CARDIOLOGY | Facility: CLINIC | Age: 53
End: 2023-05-30

## 2023-05-30 VITALS
DIASTOLIC BLOOD PRESSURE: 72 MMHG | HEART RATE: 76 BPM | OXYGEN SATURATION: 95 % | HEIGHT: 66 IN | SYSTOLIC BLOOD PRESSURE: 126 MMHG | BODY MASS INDEX: 24.91 KG/M2 | WEIGHT: 155 LBS

## 2023-05-30 DIAGNOSIS — K21.9 GASTROESOPHAGEAL REFLUX DISEASE WITHOUT ESOPHAGITIS: ICD-10-CM

## 2023-05-30 DIAGNOSIS — Z72.0 TOBACCO USER: ICD-10-CM

## 2023-05-30 DIAGNOSIS — R06.02 SOB (SHORTNESS OF BREATH): ICD-10-CM

## 2023-05-30 DIAGNOSIS — R94.31 ABNORMAL ECG: Primary | ICD-10-CM

## 2023-05-30 DIAGNOSIS — Z00.00 ENCOUNTER FOR PREVENTIVE CARE: ICD-10-CM

## 2023-05-30 DIAGNOSIS — J44.9 CHRONIC OBSTRUCTIVE PULMONARY DISEASE, UNSPECIFIED COPD TYPE: ICD-10-CM

## 2023-05-30 DIAGNOSIS — F32.A DEPRESSIVE DISORDER: ICD-10-CM

## 2023-05-30 DIAGNOSIS — M81.0 OSTEOPOROSIS, UNSPECIFIED OSTEOPOROSIS TYPE, UNSPECIFIED PATHOLOGICAL FRACTURE PRESENCE: ICD-10-CM

## 2023-05-30 LAB
CHOLEST SERPL-MCNC: 229 MG/DL (ref 0–200)
HBA1C MFR BLD: 5.1 % (ref 4.8–5.6)
HDLC SERPL-MCNC: 65 MG/DL (ref 40–60)
LDLC SERPL CALC-MCNC: 144 MG/DL (ref 0–100)
LDLC/HDLC SERPL: 2.18 {RATIO}
TRIGL SERPL-MCNC: 112 MG/DL (ref 0–150)
VLDLC SERPL-MCNC: 20 MG/DL (ref 5–40)

## 2023-05-30 PROCEDURE — 83036 HEMOGLOBIN GLYCOSYLATED A1C: CPT

## 2023-05-30 PROCEDURE — 36415 COLL VENOUS BLD VENIPUNCTURE: CPT

## 2023-05-30 PROCEDURE — 80061 LIPID PANEL: CPT

## 2023-05-31 ENCOUNTER — TELEPHONE (OUTPATIENT)
Dept: FAMILY MEDICINE CLINIC | Facility: CLINIC | Age: 53
End: 2023-05-31

## 2023-05-31 DIAGNOSIS — F64.0 TRANSSEXUALISM: Primary | ICD-10-CM

## 2023-05-31 RX ORDER — ATORVASTATIN CALCIUM 40 MG/1
40 TABLET, FILM COATED ORAL DAILY
Qty: 90 TABLET | Refills: 3 | Status: SHIPPED | OUTPATIENT
Start: 2023-05-31

## 2023-05-31 NOTE — TELEPHONE ENCOUNTER
The patient called requesting a referral to [facility] for Endo. I called the office to verify they are accepting new patients for hormone replacement are excepting new pt. Phone number is [phone]. Fax# is [phone]

## 2023-05-31 NOTE — PROGRESS NOTES
Called patient and reviewed results of A1c and lipid panel. Will start patient on atorvastatin 40 mg daily. New prescription sent to pharmacy on file. Also discussed lifestyle modifications including diet, exercise, and smoking cessation. Patient verbalized understanding and is agreeable to plan.     Electronically signed by LEONOR Garcia, 05/31/23, 2:51 PM EDT.

## 2023-06-02 ENCOUNTER — PATIENT ROUNDING (BHMG ONLY) (OUTPATIENT)
Dept: CARDIOLOGY | Facility: CLINIC | Age: 53
End: 2023-06-02

## 2023-06-05 ENCOUNTER — OFFICE VISIT (OUTPATIENT)
Dept: PAIN MEDICINE | Facility: CLINIC | Age: 53
End: 2023-06-05
Payer: MEDICAID

## 2023-06-05 VITALS
RESPIRATION RATE: 16 BRPM | OXYGEN SATURATION: 95 % | HEART RATE: 68 BPM | DIASTOLIC BLOOD PRESSURE: 62 MMHG | SYSTOLIC BLOOD PRESSURE: 120 MMHG

## 2023-06-05 DIAGNOSIS — M51.36 DDD (DEGENERATIVE DISC DISEASE), LUMBAR: ICD-10-CM

## 2023-06-05 DIAGNOSIS — Z79.899 HIGH RISK MEDICATION USE: Primary | ICD-10-CM

## 2023-06-05 DIAGNOSIS — M54.16 LUMBAR RADICULOPATHY: ICD-10-CM

## 2023-06-05 PROCEDURE — 1160F RVW MEDS BY RX/DR IN RCRD: CPT | Performed by: STUDENT IN AN ORGANIZED HEALTH CARE EDUCATION/TRAINING PROGRAM

## 2023-06-05 PROCEDURE — 1125F AMNT PAIN NOTED PAIN PRSNT: CPT | Performed by: STUDENT IN AN ORGANIZED HEALTH CARE EDUCATION/TRAINING PROGRAM

## 2023-06-05 PROCEDURE — 1159F MED LIST DOCD IN RCRD: CPT | Performed by: STUDENT IN AN ORGANIZED HEALTH CARE EDUCATION/TRAINING PROGRAM

## 2023-06-05 PROCEDURE — 99214 OFFICE O/P EST MOD 30 MIN: CPT | Performed by: STUDENT IN AN ORGANIZED HEALTH CARE EDUCATION/TRAINING PROGRAM

## 2023-06-05 RX ORDER — HYDROCODONE BITARTRATE AND ACETAMINOPHEN 10; 325 MG/1; MG/1
1 TABLET ORAL EVERY 8 HOURS PRN
Qty: 90 TABLET | Refills: 0 | Status: SHIPPED | OUTPATIENT
Start: 2023-06-24 | End: 2023-07-24

## 2023-06-05 RX ORDER — HYDROCODONE BITARTRATE AND ACETAMINOPHEN 10; 325 MG/1; MG/1
1 TABLET ORAL EVERY 8 HOURS PRN
Qty: 90 TABLET | Refills: 0 | Status: SHIPPED | OUTPATIENT
Start: 2023-07-22

## 2023-06-05 RX ORDER — HYDROCODONE BITARTRATE AND ACETAMINOPHEN 10; 325 MG/1; MG/1
1 TABLET ORAL EVERY 6 HOURS PRN
Qty: 90 TABLET | Refills: 0 | Status: SHIPPED | OUTPATIENT
Start: 2023-08-21

## 2023-06-05 NOTE — PROGRESS NOTES
CHIEF COMPLAINT  Chief Complaint   Patient presents with    Back Pain     1 month f/u  CC  Lumbar radiculopathy  Treated w/Hydrocodone 06/05 @ 7 am        Primary Care  Jaylyn Moscoso, LEONOR Sargent   Geni Rea is a 52 y.o. female  who presents to obtain narcotics.  She states that she has been taking narcotics for many years for chronic pain.  She reports she has a significant history of osteoarthritis as well as facet arthropathy and bilateral hip pain.  She reports she has tried physical therapy and interventions in the past which were not effective.  She gets minimal benefit with narcotics.  She has no imaging.  She recently moved and is transferring care.    History of Present Illness     Location: Low back, hips  Onset: Years ago  Duration: Worsening  Timing: Constant throughout the day  Quality: Sharp, stabbing  Severity: Today: 9       Last Week: 9       Worst: 10  Modifying Factors: The pain is worse with movement and physical activity and slightly improved with narcotics  Functional Deficit: The pain makes it difficult for her to work perform her activities of daily living    Physical Therapy: no    Interval Update 06/05/2023: Unchanged.  Gets minimal benefit with hydrocodone.  Plain films show minimal degenerative change with some scoliosis    The following portions of the patient's history were reviewed and updated as appropriate: allergies, current medications, past family history, past medical history, past social history, past surgical history and problem list.      Current Outpatient Medications:     albuterol (PROVENTIL) 2.5 MG/0.5ML nebulizer solution, Take 2.5 mg by nebulization 2 (Two) Times a Day As Needed for Wheezing or Shortness of Air.  2x dayprn, Disp: 3 mL, Rfl: 2    albuterol sulfate  (90 Base) MCG/ACT inhaler, Inhale 2 puffs Every 4 (Four) Hours As Needed for Wheezing., Disp: 18 g, Rfl: 3    alendronate (FOSAMAX) 70 MG tablet, Take 1 tablet by mouth Every 7 (Seven)  Days., Disp: 4 tablet, Rfl: 11    atorvastatin (LIPITOR) 40 MG tablet, Take 1 tablet by mouth Daily., Disp: 90 tablet, Rfl: 3    budesonide-formoterol (SYMBICORT) 160-4.5 MCG/ACT inhaler, Inhale 2 puffs 2 (Two) Times a Day., Disp: , Rfl:     Cariprazine HCl (Vraylar) 1.5 MG capsule capsule, Take 1 capsule by mouth Daily., Disp: 90 capsule, Rfl: 2    Cholecalciferol 125 MCG (5000 UT) tablet, Take 1 tablet by mouth Daily., Disp: , Rfl:     estradiol valerate (DELESTROGEN) 20 MG/ML injection, Inject 5 mg into the appropriate muscle as directed by prescriber., Disp: , Rfl:     [START ON 6/24/2023] HYDROcodone-acetaminophen (NORCO)  MG per tablet, Take 1 tablet by mouth Every 8 (Eight) Hours As Needed for Moderate Pain for up to 30 days. for pain, Disp: 90 tablet, Rfl: 0    ibuprofen (ADVIL,MOTRIN) 800 MG tablet, Take 1 tablet by mouth Every 12 (Twelve) Hours., Disp: 90 tablet, Rfl: 2    methocarbamol (ROBAXIN) 750 MG tablet, Take 1 tablet by mouth 4 (Four) Times a Day As Needed for Muscle Spasms., Disp: 90 tablet, Rfl: 2    omeprazole (priLOSEC) 40 MG capsule, Take 1 capsule by mouth Daily., Disp: 90 capsule, Rfl: 3    PARoxetine (PAXIL) 30 MG tablet, Take 1 tablet by mouth every night at bedtime., Disp: 90 tablet, Rfl: 2    tiotropium (SPIRIVA) 18 MCG per inhalation capsule, Place 1 capsule into inhaler and inhale Daily., Disp: , Rfl:     [START ON 7/22/2023] HYDROcodone-acetaminophen (NORCO)  MG per tablet, Take 1 tablet by mouth Every 8 (Eight) Hours As Needed for Severe Pain., Disp: 90 tablet, Rfl: 0    [START ON 8/21/2023] HYDROcodone-acetaminophen (NORCO)  MG per tablet, Take 1 tablet by mouth Every 6 (Six) Hours As Needed for Moderate Pain., Disp: 90 tablet, Rfl: 0    Review of Systems   Musculoskeletal:  Positive for arthralgias, back pain, gait problem, joint swelling and myalgias. Negative for neck pain and neck stiffness.   Neurological:  Negative for weakness and numbness.     Vitals:     06/05/23 1027   BP: 120/62   Pulse: 68   Resp: 16   SpO2: 95%   PainSc:   9       Urine Drug Screen: 5/8/2023  Appropriate: Negative for hydrocodone    Objective   Physical Exam  Vitals and nursing note reviewed.   Constitutional:       General: She is not in acute distress.     Appearance: Normal appearance. She is normal weight.   Musculoskeletal:      Lumbar back: Tenderness present. Decreased range of motion.   Neurological:      Mental Status: She is alert.         Assessment & Plan   Problems Addressed this Visit    None  Visit Diagnoses       High risk medication use    -  Primary    Relevant Orders    Urine Drug Screen - Urine, Clean Catch    Lumbar radiculopathy        Relevant Medications    HYDROcodone-acetaminophen (NORCO)  MG per tablet (Start on 6/24/2023)    HYDROcodone-acetaminophen (NORCO)  MG per tablet (Start on 7/22/2023)    HYDROcodone-acetaminophen (NORCO)  MG per tablet (Start on 8/21/2023)    DDD (degenerative disc disease), lumbar        Relevant Medications    HYDROcodone-acetaminophen (NORCO)  MG per tablet (Start on 6/24/2023)    HYDROcodone-acetaminophen (NORCO)  MG per tablet (Start on 7/22/2023)    HYDROcodone-acetaminophen (NORCO)  MG per tablet (Start on 8/21/2023)          Diagnoses         Codes Comments    High risk medication use    -  Primary ICD-10-CM: Z79.899  ICD-9-CM: V58.69     Lumbar radiculopathy     ICD-10-CM: M54.16  ICD-9-CM: 724.4     DDD (degenerative disc disease), lumbar     ICD-10-CM: M51.36  ICD-9-CM: 722.52             Plan:  Continue hydrocodone 10 mg 3 times daily.  Her plain films show minimal degenerative change with some scoliosis however pain appears to be significantly out of proportion to her imaging  Repeat UDS today as her previous was negative for prescribed hydrocodone  --- Follow-up 3 months           INSPECT REPORT    As part of the patient's treatment plan, I may be prescribing controlled substances. The patient  has been made aware of appropriate use of such medications, including potential risk of somnolence, limited ability to drive and/or work safely, and the potential for dependence or overdose. It has also bee made clear that these medications are for use by this patient only, without concomitant use of alcohol or other substances unless prescribed.     Patient has completed prescribing agreement detailing terms of continued prescribing of controlled substances, including monitoring EMMA reports, urine drug screening, and pill counts if necessary. The patient is aware that inappropriate use will results in cessation of prescribing such medications.    INSPECT report has been reviewed and scanned into the patient's chart.    As the clinician, I personally reviewed the INSPECT from 6/3/2023.    History and physical exam exhibit continued safe and appropriate use of controlled substances.      EMR Dragon/Transcription disclaimer:   Much of this encounter note is an electronic transcription/translation of spoken language to printed text. The electronic translation of spoken language may permit erroneous, or at times, nonsensical words or phrases to be inadvertently transcribed; Although I have reviewed the note for such errors, some may still exist.

## 2023-06-07 ENCOUNTER — TELEPHONE (OUTPATIENT)
Dept: CARDIOLOGY | Facility: CLINIC | Age: 53
End: 2023-06-07
Payer: MEDICAID

## 2023-06-07 NOTE — TELEPHONE ENCOUNTER
FACILITY: [facility]  DR: JAMES MAYA  PHONE: [phone]  FAX: [phone]  PROCEDURE: VAGINOPLASTY  SCHEDULED: SATURDAY 6/10  MEDS TO HOLD: NO MEDS TO HOLD, BUT NEEDS CARDIAC CLEARANCE FOR 8 HOUR ANESTHESIA    GIVEN TO  FOR REVIEW.

## 2023-06-22 LAB
BH CV ECHO MEAS - ACS: 1.69 CM
BH CV ECHO MEAS - AO MAX PG: 9.8 MMHG
BH CV ECHO MEAS - AO MEAN PG: 5.1 MMHG
BH CV ECHO MEAS - AO ROOT DIAM: 2.9 CM
BH CV ECHO MEAS - AO V2 MAX: 156.1 CM/SEC
BH CV ECHO MEAS - AO V2 VTI: 32.7 CM
BH CV ECHO MEAS - AVA(I,D): 1.88 CM2
BH CV ECHO MEAS - EDV(CUBED): 132.7 ML
BH CV ECHO MEAS - EDV(MOD-SP4): 74.7 ML
BH CV ECHO MEAS - EF(MOD-BP): 64 %
BH CV ECHO MEAS - EF(MOD-SP4): 64.1 %
BH CV ECHO MEAS - ESV(CUBED): 37.9 ML
BH CV ECHO MEAS - ESV(MOD-SP4): 26.8 ML
BH CV ECHO MEAS - FS: 34.1 %
BH CV ECHO MEAS - IVS/LVPW: 0.81 CM
BH CV ECHO MEAS - IVSD: 0.72 CM
BH CV ECHO MEAS - LA DIMENSION: 3.5 CM
BH CV ECHO MEAS - LV DIASTOLIC VOL/BSA (35-75): 41.6 CM2
BH CV ECHO MEAS - LV MASS(C)D: 140.3 GRAMS
BH CV ECHO MEAS - LV MAX PG: 5.4 MMHG
BH CV ECHO MEAS - LV MEAN PG: 2.48 MMHG
BH CV ECHO MEAS - LV SYSTOLIC VOL/BSA (12-30): 15 CM2
BH CV ECHO MEAS - LV V1 MAX: 116.2 CM/SEC
BH CV ECHO MEAS - LV V1 VTI: 23.1 CM
BH CV ECHO MEAS - LVIDD: 5.1 CM
BH CV ECHO MEAS - LVIDS: 3.4 CM
BH CV ECHO MEAS - LVOT AREA: 2.7 CM2
BH CV ECHO MEAS - LVOT DIAM: 1.84 CM
BH CV ECHO MEAS - LVPWD: 0.88 CM
BH CV ECHO MEAS - MV A MAX VEL: 69.1 CM/SEC
BH CV ECHO MEAS - MV DEC SLOPE: 348.8 CM/SEC2
BH CV ECHO MEAS - MV DEC TIME: 0.23 MSEC
BH CV ECHO MEAS - MV E MAX VEL: 81 CM/SEC
BH CV ECHO MEAS - MV E/A: 1.17
BH CV ECHO MEAS - MV MAX PG: 3.4 MMHG
BH CV ECHO MEAS - MV MEAN PG: 1.31 MMHG
BH CV ECHO MEAS - MV V2 VTI: 23.4 CM
BH CV ECHO MEAS - MVA(VTI): 2.6 CM2
BH CV ECHO MEAS - PA ACC TIME: 0.16 SEC
BH CV ECHO MEAS - PULM A REVS DUR: 0.1 SEC
BH CV ECHO MEAS - PULM A REVS VEL: 32.3 CM/SEC
BH CV ECHO MEAS - PULM DIAS VEL: 66.5 CM/SEC
BH CV ECHO MEAS - PULM S/D: 1.01
BH CV ECHO MEAS - PULM SYS VEL: 67.3 CM/SEC
BH CV ECHO MEAS - RAP SYSTOLE: 3 MMHG
BH CV ECHO MEAS - RV MAX PG: 2.34 MMHG
BH CV ECHO MEAS - RV V1 MAX: 76.4 CM/SEC
BH CV ECHO MEAS - RV V1 VTI: 16.8 CM
BH CV ECHO MEAS - RVDD: 3.4 CM
BH CV ECHO MEAS - RVSP: 33.2 MMHG
BH CV ECHO MEAS - SI(MOD-SP4): 26.7 ML/M2
BH CV ECHO MEAS - SV(LVOT): 61.4 ML
BH CV ECHO MEAS - SV(MOD-SP4): 47.8 ML
BH CV ECHO MEAS - TR MAX PG: 30.2 MMHG
BH CV ECHO MEAS - TR MAX VEL: 274.8 CM/SEC
MAXIMAL PREDICTED HEART RATE: 167 BPM
STRESS TARGET HR: 142 BPM

## 2023-07-21 ENCOUNTER — TRANSCRIBE ORDERS (OUTPATIENT)
Dept: RESPIRATORY THERAPY | Facility: HOSPITAL | Age: 53
End: 2023-07-21
Payer: MEDICAID

## 2023-07-21 DIAGNOSIS — J44.9 CHRONIC OBSTRUCTIVE PULMONARY DISEASE, UNSPECIFIED COPD TYPE: Primary | ICD-10-CM

## 2023-07-21 DIAGNOSIS — Z01.818 PRE-OPERATIVE CLEARANCE: ICD-10-CM

## 2023-08-29 NOTE — PROGRESS NOTES
Encounter Date:08/30/2023        Patient ID: Geni Rea is a 52 y.o. female.      Chief Complaint:      History of Present Illness  52 years old very pleasant transgender woman presents to the office for follow-up.  She was initially referred to cardiology due to findings of abnormal ECG which showed sinus bradycardia with right bundle branch block with a QRS of more than 150 ms.  She denies any known cardiac history.  She has chronic pain for which she takes Norco.  She also has GERD, osteoarthritis, COPD.  She is a current smoker.   Today she denies any chest pain however does complain of shortness of breath which she attributes to COPD.    The following portions of the patient's history were reviewed and updated as appropriate: allergies, current medications, past family history, past medical history, past social history, past surgical history, and problem list.    Review of Systems   Constitutional: Positive for malaise/fatigue.   Cardiovascular:  Positive for leg swelling. Negative for chest pain, dyspnea on exertion and palpitations.   Respiratory:  Positive for shortness of breath. Negative for cough.    Gastrointestinal:  Negative for abdominal pain, nausea and vomiting.   Neurological:  Positive for numbness. Negative for dizziness, focal weakness, headaches and light-headedness.   All other systems reviewed and are negative.      Current Outpatient Medications:     albuterol (PROVENTIL) 2.5 MG/0.5ML nebulizer solution, Take 2.5 mg by nebulization 2 (Two) Times a Day As Needed for Wheezing or Shortness of Air.  2x dayprn, Disp: 3 mL, Rfl: 2    albuterol sulfate  (90 Base) MCG/ACT inhaler, Inhale 2 puffs Every 4 (Four) Hours As Needed for Wheezing., Disp: 18 g, Rfl: 3    alendronate (FOSAMAX) 70 MG tablet, Take 1 tablet by mouth Every 7 (Seven) Days., Disp: 4 tablet, Rfl: 11    atorvastatin (LIPITOR) 40 MG tablet, Take 1 tablet by mouth Daily., Disp: 90 tablet, Rfl: 3    budesonide-formoterol  (SYMBICORT) 160-4.5 MCG/ACT inhaler, Inhale 2 puffs 2 (Two) Times a Day., Disp: , Rfl:     Cariprazine HCl (Vraylar) 1.5 MG capsule capsule, Take 1 capsule by mouth Daily., Disp: 90 capsule, Rfl: 2    Cholecalciferol 125 MCG (5000 UT) tablet, Take 1 tablet by mouth Daily., Disp: , Rfl:     estradiol valerate (DELESTROGEN) 20 MG/ML injection, Inject 0.25 mL into the appropriate muscle as directed by prescriber., Disp: , Rfl:     HYDROcodone-acetaminophen (NORCO)  MG per tablet, Take 1 tablet by mouth Every 6 (Six) Hours As Needed for Moderate Pain., Disp: 90 tablet, Rfl: 0    ibuprofen (ADVIL,MOTRIN) 800 MG tablet, Take 1 tablet by mouth Every 12 (Twelve) Hours., Disp: 90 tablet, Rfl: 2    methocarbamol (ROBAXIN) 750 MG tablet, Take 1 tablet by mouth 4 (Four) Times a Day As Needed for Muscle Spasms., Disp: 90 tablet, Rfl: 2    omeprazole (priLOSEC) 40 MG capsule, Take 1 capsule by mouth Daily., Disp: 90 capsule, Rfl: 3    PARoxetine (PAXIL) 30 MG tablet, Take 1 tablet by mouth every night at bedtime., Disp: 90 tablet, Rfl: 2    tiotropium (SPIRIVA) 18 MCG per inhalation capsule, Place 1 capsule into inhaler and inhale Daily., Disp: , Rfl:     Current outpatient and discharge medications have been reconciled for the patient.  Reviewed by: Darien Ramos MD       Allergies   Allergen Reactions    Bupropion Anaphylaxis, Shortness Of Breath, Itching and Swelling           Penicillins Other (See Comments) and Rash     Unknown reaction as child       Family History   Problem Relation Age of Onset    Hypertension Mother     Hypertension Father     Kidney disease Father     Cancer Father     Diabetes Father     Diabetes Paternal Grandmother     Other Paternal Grandfather         emphazema       Past Surgical History:   Procedure Laterality Date    TONSILLECTOMY  1976       Past Medical History:   Diagnosis Date    Anxiety 2007    Arthritis 2014    Asthma 2016    COPD (chronic obstructive pulmonary disease) 2016     "Depression 2007    GERD (gastroesophageal reflux disease) 2007    Heart murmur 2021    Irritable bowel syndrome 2001    Low back pain 2015    Osteopenia 2014    Scoliosis 2014    Visual impairment 1989       Family History   Problem Relation Age of Onset    Hypertension Mother     Hypertension Father     Kidney disease Father     Cancer Father     Diabetes Father     Diabetes Paternal Grandmother     Other Paternal Grandfather         emphazema       Social History     Socioeconomic History    Marital status: Single   Tobacco Use    Smoking status: Some Days     Packs/day: 0.50     Years: 36.00     Pack years: 18.00     Types: Cigarettes    Smokeless tobacco: Never   Vaping Use    Vaping Use: Never used   Substance and Sexual Activity    Alcohol use: Not Currently     Alcohol/week: 3.0 standard drinks     Types: 3 Drinks containing 0.5 oz of alcohol per week    Drug use: Never    Sexual activity: Yes     Partners: Male     Birth control/protection: Condom, None               Objective:       Physical Exam    /71 (BP Location: Left arm, Patient Position: Sitting, Cuff Size: Adult)   Pulse 72   Ht 167.6 cm (66\")   Wt 71.7 kg (158 lb)   SpO2 94%   BMI 25.50 kg/mý   The patient is alert, oriented and in no distress.    Vital signs as noted above.    Head and neck revealed no carotid bruits or jugular venous distension.  No thyromegaly or lymphadenopathy is present.    Lungs clear.  No wheezing.  Breath sounds are normal bilaterally.    Heart normal first and second heart sounds.  No murmur..  No pericardial rub is present.  No gallop is present.    Abdomen soft and nontender.  No organomegaly is present.    Extremities revealed good peripheral pulses without any pedal edema.    Skin warm and dry.    Musculoskeletal system is grossly normal.    CNS grossly normal.           Diagnosis Plan   1. Abnormal ECG        2. Chronic obstructive pulmonary disease, unspecified COPD type        3. Tobacco user        4. " Depressive disorder        5. Gastroesophageal reflux disease without esophagitis        6. Osteoporosis, unspecified osteoporosis type, unspecified pathological fracture presence        7. Encounter for preventive care        8. SOB (shortness of breath)        9. Pre-op testing        LAB RESULTS (LAST 7 DAYS)    CBC        BMP        CMP         BNP        TROPONIN        CoAg        Creatinine Clearance  CrCl cannot be calculated (Patient's most recent lab result is older than the maximum 30 days allowed.).    ABG        Radiology  No radiology results for the last day    EKG  Procedures    Stress test      Echocardiogram  Results for orders placed in visit on 05/30/23    Adult Transthoracic Echo Complete W/ Cont if Necessary Per Protocol    Interpretation Summary    Left ventricular systolic function is normal. Calculated left ventricular EF = 64% Left ventricular ejection fraction appears to be 61 - 65%.    Left ventricular diastolic function was normal.    Estimated right ventricular systolic pressure from tricuspid regurgitation is normal (<35 mmHg).    No significant valvular abnormalities.      Cardiac catheterization  No results found for this or any previous visit.          Assessment and Plan       Diagnoses and all orders for this visit:    1. Abnormal ECG (Primary)    2. Chronic obstructive pulmonary disease, unspecified COPD type    3. Tobacco user    4. Depressive disorder    5. Gastroesophageal reflux disease without esophagitis    6. Osteoporosis, unspecified osteoporosis type, unspecified pathological fracture presence    7. Encounter for preventive care    8. SOB (shortness of breath)    9. Pre-op testing       1. Abnormal ECG (Primary)  She has sinus bradycardia and right bundle branch block with a rather prolonged QRS of more than 150 ms.  Echocardiogram shows EF of 64%, no significant valvular abnormalities.  2. Chronic obstructive pulmonary disease, unspecified COPD type  Continue  bronchodilators  Follow-up with pulmonology for work-up of COPD  3. Tobacco user  Smoking cessation counseling provided to the patient.  She is due to see pulmonology for work-up of COPD.  Continues to smoke half pack a day.  4. Depressive disorder  She currently takes paroxetine  5. Gastroesophageal reflux disease without esophagitis  Continue omeprazole  6. Osteoporosis, unspecified osteoporosis type, unspecified pathological fracture presence  On Fosamax  7.  Encounter for preventive care  Lipid panel shows LDL of 87, HDL 53, triglyceride 125 and total cholesterol 162.  A1c is 5.1  LDL goal is less than 100, continue atorvastatin  Encouraged aerobic exercises, discussed cardiac diet and healthy lifestyle changes.  I have also encouraged her to take an aspirin a day because she is on estradiol therapy.

## 2023-08-30 ENCOUNTER — OFFICE VISIT (OUTPATIENT)
Dept: CARDIOLOGY | Facility: CLINIC | Age: 53
End: 2023-08-30
Payer: MEDICAID

## 2023-08-30 ENCOUNTER — HOSPITAL ENCOUNTER (OUTPATIENT)
Dept: RESPIRATORY THERAPY | Facility: HOSPITAL | Age: 53
Discharge: HOME | End: 2023-08-30
Payer: MEDICAID

## 2023-08-30 VITALS
SYSTOLIC BLOOD PRESSURE: 120 MMHG | OXYGEN SATURATION: 94 % | WEIGHT: 158 LBS | HEIGHT: 66 IN | BODY MASS INDEX: 25.39 KG/M2 | HEART RATE: 72 BPM | DIASTOLIC BLOOD PRESSURE: 71 MMHG

## 2023-08-30 VITALS — HEART RATE: 71 BPM | OXYGEN SATURATION: 96 % | RESPIRATION RATE: 18 BRPM

## 2023-08-30 DIAGNOSIS — Z01.818 PRE-OPERATIVE CLEARANCE: ICD-10-CM

## 2023-08-30 DIAGNOSIS — Z72.0 TOBACCO USER: ICD-10-CM

## 2023-08-30 DIAGNOSIS — F32.A DEPRESSIVE DISORDER: ICD-10-CM

## 2023-08-30 DIAGNOSIS — J44.9 CHRONIC OBSTRUCTIVE PULMONARY DISEASE, UNSPECIFIED COPD TYPE: ICD-10-CM

## 2023-08-30 DIAGNOSIS — M81.0 OSTEOPOROSIS, UNSPECIFIED OSTEOPOROSIS TYPE, UNSPECIFIED PATHOLOGICAL FRACTURE PRESENCE: ICD-10-CM

## 2023-08-30 DIAGNOSIS — Z00.00 ENCOUNTER FOR PREVENTIVE CARE: ICD-10-CM

## 2023-08-30 DIAGNOSIS — R94.31 ABNORMAL ECG: Primary | ICD-10-CM

## 2023-08-30 DIAGNOSIS — K21.9 GASTROESOPHAGEAL REFLUX DISEASE WITHOUT ESOPHAGITIS: ICD-10-CM

## 2023-08-30 DIAGNOSIS — Z01.818 PRE-OP TESTING: ICD-10-CM

## 2023-08-30 DIAGNOSIS — R06.02 SOB (SHORTNESS OF BREATH): ICD-10-CM

## 2023-08-30 PROBLEM — E78.5 DYSLIPIDEMIA: Status: ACTIVE | Noted: 2023-06-21

## 2023-08-30 PROCEDURE — 94640 AIRWAY INHALATION TREATMENT: CPT

## 2023-08-30 PROCEDURE — 94729 DIFFUSING CAPACITY: CPT

## 2023-08-30 PROCEDURE — 94726 PLETHYSMOGRAPHY LUNG VOLUMES: CPT

## 2023-08-30 PROCEDURE — 94799 UNLISTED PULMONARY SVC/PX: CPT

## 2023-08-30 PROCEDURE — 94060 EVALUATION OF WHEEZING: CPT

## 2023-08-30 PROCEDURE — 94664 DEMO&/EVAL PT USE INHALER: CPT

## 2023-08-30 RX ORDER — ALBUTEROL SULFATE 90 UG/1
2 AEROSOL, METERED RESPIRATORY (INHALATION) ONCE
Status: COMPLETED | OUTPATIENT
Start: 2023-08-30 | End: 2023-08-30

## 2023-08-30 RX ADMIN — ALBUTEROL SULFATE 2 PUFF: 108 AEROSOL, METERED RESPIRATORY (INHALATION) at 13:00

## 2023-09-11 ENCOUNTER — OFFICE VISIT (OUTPATIENT)
Dept: PAIN MEDICINE | Facility: CLINIC | Age: 53
End: 2023-09-11
Payer: MEDICAID

## 2023-09-11 VITALS
SYSTOLIC BLOOD PRESSURE: 128 MMHG | OXYGEN SATURATION: 95 % | HEART RATE: 70 BPM | DIASTOLIC BLOOD PRESSURE: 67 MMHG | RESPIRATION RATE: 16 BRPM

## 2023-09-11 DIAGNOSIS — M51.36 DDD (DEGENERATIVE DISC DISEASE), LUMBAR: ICD-10-CM

## 2023-09-11 DIAGNOSIS — M54.16 LUMBAR RADICULOPATHY: ICD-10-CM

## 2023-09-11 DIAGNOSIS — F11.20 NARCOTIC DEPENDENCE: Primary | ICD-10-CM

## 2023-09-11 PROCEDURE — 99214 OFFICE O/P EST MOD 30 MIN: CPT | Performed by: STUDENT IN AN ORGANIZED HEALTH CARE EDUCATION/TRAINING PROGRAM

## 2023-09-11 PROCEDURE — 1125F AMNT PAIN NOTED PAIN PRSNT: CPT | Performed by: STUDENT IN AN ORGANIZED HEALTH CARE EDUCATION/TRAINING PROGRAM

## 2023-09-11 RX ORDER — METHOCARBAMOL 750 MG/1
750 TABLET, FILM COATED ORAL 4 TIMES DAILY PRN
Qty: 90 TABLET | Refills: 2 | Status: SHIPPED | OUTPATIENT
Start: 2023-09-11

## 2023-09-11 RX ORDER — HYDROCODONE BITARTRATE AND ACETAMINOPHEN 10; 325 MG/1; MG/1
1 TABLET ORAL EVERY 8 HOURS PRN
Qty: 90 TABLET | Refills: 0 | Status: SHIPPED | OUTPATIENT
Start: 2023-10-21

## 2023-09-11 RX ORDER — HYDROCODONE BITARTRATE AND ACETAMINOPHEN 10; 325 MG/1; MG/1
1 TABLET ORAL EVERY 8 HOURS PRN
Qty: 90 TABLET | Refills: 0 | Status: SHIPPED | OUTPATIENT
Start: 2023-09-23

## 2023-09-11 RX ORDER — HYDROCODONE BITARTRATE AND ACETAMINOPHEN 10; 325 MG/1; MG/1
1 TABLET ORAL EVERY 8 HOURS PRN
Qty: 90 TABLET | Refills: 0 | Status: SHIPPED | OUTPATIENT
Start: 2023-11-22

## 2023-09-11 NOTE — PROGRESS NOTES
CHIEF COMPLAINT  Chief Complaint   Patient presents with    Back Pain     3 month f/u  CC  Lumbar pain Treated w/Hydrocodone LD @ 8:30 am 09/11       Primary Care  Jaylyn Moscoso, LEONOR Sargent   Geni Rea is a 52 y.o. female  who presents to obtain narcotics.  She states that she has been taking narcotics for many years for chronic pain.  She reports she has a significant history of osteoarthritis as well as facet arthropathy and bilateral hip pain.  She reports she has tried physical therapy and interventions in the past which were not effective.  She gets minimal benefit with narcotics.  She has no imaging.  She recently moved and is transferring care.    History of Present Illness     Location: Low back, hips  Onset: Years ago  Duration: Worsening  Timing: Constant throughout the day  Quality: Sharp, stabbing  Severity: Today: 9       Last Week: 9       Worst: 10  Modifying Factors: The pain is worse with movement and physical activity and slightly improved with narcotics  Functional Deficit: The pain makes it difficult for her to work perform her activities of daily living    Physical Therapy: no    Interval Update 09/11/2023: Unchanged.  Gets minimal benefit with hydrocodone.  Plain films show minimal degenerative change with some scoliosis.  Continues to complain of very severe pain    The following portions of the patient's history were reviewed and updated as appropriate: allergies, current medications, past family history, past medical history, past social history, past surgical history and problem list.      Current Outpatient Medications:     albuterol (PROVENTIL) 2.5 MG/0.5ML nebulizer solution, Take 2.5 mg by nebulization 2 (Two) Times a Day As Needed for Wheezing or Shortness of Air.  2x dayprn, Disp: 3 mL, Rfl: 2    albuterol sulfate  (90 Base) MCG/ACT inhaler, Inhale 2 puffs Every 4 (Four) Hours As Needed for Wheezing., Disp: 18 g, Rfl: 3    alendronate (FOSAMAX) 70 MG tablet,  Take 1 tablet by mouth Every 7 (Seven) Days., Disp: 4 tablet, Rfl: 11    atorvastatin (LIPITOR) 40 MG tablet, Take 1 tablet by mouth Daily., Disp: 90 tablet, Rfl: 3    budesonide-formoterol (SYMBICORT) 160-4.5 MCG/ACT inhaler, Inhale 2 puffs 2 (Two) Times a Day., Disp: , Rfl:     Cariprazine HCl (Vraylar) 1.5 MG capsule capsule, Take 1 capsule by mouth Daily., Disp: 90 capsule, Rfl: 2    Cholecalciferol 125 MCG (5000 UT) tablet, Take 1 tablet by mouth Daily., Disp: , Rfl:     estradiol valerate (DELESTROGEN) 20 MG/ML injection, Inject 0.25 mL into the appropriate muscle as directed by prescriber., Disp: , Rfl:     [START ON 10/21/2023] HYDROcodone-acetaminophen (NORCO)  MG per tablet, Take 1 tablet by mouth Every 8 (Eight) Hours As Needed for Moderate Pain., Disp: 90 tablet, Rfl: 0    ibuprofen (ADVIL,MOTRIN) 800 MG tablet, Take 1 tablet by mouth Every 12 (Twelve) Hours., Disp: 90 tablet, Rfl: 2    methocarbamol (ROBAXIN) 750 MG tablet, Take 1 tablet by mouth 4 (Four) Times a Day As Needed for Muscle Spasms., Disp: 90 tablet, Rfl: 2    omeprazole (priLOSEC) 40 MG capsule, Take 1 capsule by mouth Daily., Disp: 90 capsule, Rfl: 3    PARoxetine (PAXIL) 30 MG tablet, Take 1 tablet by mouth every night at bedtime., Disp: 90 tablet, Rfl: 2    tiotropium (SPIRIVA) 18 MCG per inhalation capsule, Place 1 capsule into inhaler and inhale Daily., Disp: , Rfl:     [START ON 9/23/2023] HYDROcodone-acetaminophen (NORCO)  MG per tablet, Take 1 tablet by mouth Every 8 (Eight) Hours As Needed for Severe Pain., Disp: 90 tablet, Rfl: 0    [START ON 11/22/2023] HYDROcodone-acetaminophen (NORCO)  MG per tablet, Take 1 tablet by mouth Every 8 (Eight) Hours As Needed for Moderate Pain., Disp: 90 tablet, Rfl: 0    Review of Systems   Musculoskeletal:  Positive for arthralgias, back pain, gait problem, joint swelling and myalgias. Negative for neck pain and neck stiffness.   Neurological:  Negative for weakness and  numbness.     Vitals:    09/11/23 1028   BP: 128/67   BP Location: Right arm   Patient Position: Sitting   Cuff Size: Adult   Pulse: 70   Resp: 16   SpO2: 95%   PainSc:   9   PainLoc: Back       Urine Drug Screen: 6/5/2023  Appropriate: Negative for metabolites  Objective   Physical Exam  Vitals and nursing note reviewed.   Constitutional:       General: She is not in acute distress.     Appearance: Normal appearance. She is normal weight.   Musculoskeletal:      Lumbar back: Tenderness present. Decreased range of motion.   Neurological:      Mental Status: She is alert.         Assessment & Plan   Problems Addressed this Visit    None  Visit Diagnoses       Narcotic dependence    -  Primary    Lumbar radiculopathy        Relevant Medications    HYDROcodone-acetaminophen (NORCO)  MG per tablet (Start on 10/21/2023)    HYDROcodone-acetaminophen (NORCO)  MG per tablet (Start on 9/23/2023)    HYDROcodone-acetaminophen (NORCO)  MG per tablet (Start on 11/22/2023)    DDD (degenerative disc disease), lumbar        Relevant Medications    HYDROcodone-acetaminophen (NORCO)  MG per tablet (Start on 10/21/2023)    HYDROcodone-acetaminophen (NORCO)  MG per tablet (Start on 9/23/2023)    HYDROcodone-acetaminophen (NORCO)  MG per tablet (Start on 11/22/2023)          Diagnoses         Codes Comments    Narcotic dependence    -  Primary ICD-10-CM: F11.20  ICD-9-CM: 304.90     Lumbar radiculopathy     ICD-10-CM: M54.16  ICD-9-CM: 724.4     DDD (degenerative disc disease), lumbar     ICD-10-CM: M51.36  ICD-9-CM: 722.52             Plan:  Continue hydrocodone 10 mg 3 times daily.  Her plain films show minimal degenerative change with some scoliosis however pain appears to be significantly out of proportion to her imaging  UDS borderline.  Inspect appropriate  --- Follow-up 3 months           INSPECT REPORT    As part of the patient's treatment plan, I may be prescribing controlled substances. The  patient has been made aware of appropriate use of such medications, including potential risk of somnolence, limited ability to drive and/or work safely, and the potential for dependence or overdose. It has also bee made clear that these medications are for use by this patient only, without concomitant use of alcohol or other substances unless prescribed.     Patient has completed prescribing agreement detailing terms of continued prescribing of controlled substances, including monitoring EMMA reports, urine drug screening, and pill counts if necessary. The patient is aware that inappropriate use will results in cessation of prescribing such medications.    INSPECT report has been reviewed and scanned into the patient's chart.    As the clinician, I personally reviewed the INSPECT from 9/8/2023.    History and physical exam exhibit continued safe and appropriate use of controlled substances.      EMR Dragon/Transcription disclaimer:   Much of this encounter note is an electronic transcription/translation of spoken language to printed text. The electronic translation of spoken language may permit erroneous, or at times, nonsensical words or phrases to be inadvertently transcribed; Although I have reviewed the note for such errors, some may still exist.

## 2023-09-18 ENCOUNTER — LAB (OUTPATIENT)
Dept: LAB | Facility: HOSPITAL | Age: 53
End: 2023-09-18
Payer: MEDICAID

## 2023-09-18 ENCOUNTER — TRANSCRIBE ORDERS (OUTPATIENT)
Dept: ADMINISTRATIVE | Facility: HOSPITAL | Age: 53
End: 2023-09-18
Payer: MEDICAID

## 2023-09-18 DIAGNOSIS — Z01.812 PRE-OPERATIVE LABORATORY EXAMINATION: ICD-10-CM

## 2023-09-18 DIAGNOSIS — F64.1 DUAL ROLE TRANSVESTISM: Primary | ICD-10-CM

## 2023-09-18 DIAGNOSIS — F64.1 DUAL ROLE TRANSVESTISM: ICD-10-CM

## 2023-09-18 LAB
ANION GAP SERPL CALCULATED.3IONS-SCNC: 10 MMOL/L (ref 5–15)
BASOPHILS # BLD AUTO: 0 10*3/MM3 (ref 0–0.2)
BASOPHILS NFR BLD AUTO: 0.6 % (ref 0–1.5)
BUN SERPL-MCNC: 7 MG/DL (ref 6–20)
BUN/CREAT SERPL: 7.5 (ref 7–25)
CALCIUM SPEC-SCNC: 9.2 MG/DL (ref 8.6–10.5)
CHLORIDE SERPL-SCNC: 103 MMOL/L (ref 98–107)
CO2 SERPL-SCNC: 28 MMOL/L (ref 22–29)
CREAT SERPL-MCNC: 0.93 MG/DL (ref 0.57–1)
DEPRECATED RDW RBC AUTO: 45.9 FL (ref 37–54)
EGFRCR SERPLBLD CKD-EPI 2021: 73.6 ML/MIN/1.73
EOSINOPHIL # BLD AUTO: 0.1 10*3/MM3 (ref 0–0.4)
EOSINOPHIL NFR BLD AUTO: 1.1 % (ref 0.3–6.2)
ERYTHROCYTE [DISTWIDTH] IN BLOOD BY AUTOMATED COUNT: 14 % (ref 12.3–15.4)
GLUCOSE SERPL-MCNC: 88 MG/DL (ref 65–99)
HCT VFR BLD AUTO: 43.1 % (ref 34–46.6)
HGB BLD-MCNC: 14.5 G/DL (ref 12–15.9)
LYMPHOCYTES # BLD AUTO: 2 10*3/MM3 (ref 0.7–3.1)
LYMPHOCYTES NFR BLD AUTO: 23.2 % (ref 19.6–45.3)
MCH RBC QN AUTO: 32.2 PG (ref 26.6–33)
MCHC RBC AUTO-ENTMCNC: 33.6 G/DL (ref 31.5–35.7)
MCV RBC AUTO: 95.8 FL (ref 79–97)
MONOCYTES # BLD AUTO: 0.7 10*3/MM3 (ref 0.1–0.9)
MONOCYTES NFR BLD AUTO: 8.4 % (ref 5–12)
NEUTROPHILS NFR BLD AUTO: 5.7 10*3/MM3 (ref 1.7–7)
NEUTROPHILS NFR BLD AUTO: 66.7 % (ref 42.7–76)
NRBC BLD AUTO-RTO: 0 /100 WBC (ref 0–0.2)
PLATELET # BLD AUTO: 155 10*3/MM3 (ref 140–450)
PMV BLD AUTO: 8.8 FL (ref 6–12)
POTASSIUM SERPL-SCNC: 4.7 MMOL/L (ref 3.5–5.2)
RBC # BLD AUTO: 4.5 10*6/MM3 (ref 3.77–5.28)
SODIUM SERPL-SCNC: 141 MMOL/L (ref 136–145)
WBC NRBC COR # BLD: 8.6 10*3/MM3 (ref 3.4–10.8)

## 2023-09-18 PROCEDURE — 36415 COLL VENOUS BLD VENIPUNCTURE: CPT

## 2023-09-18 PROCEDURE — 80048 BASIC METABOLIC PNL TOTAL CA: CPT

## 2023-09-18 PROCEDURE — 85025 COMPLETE CBC W/AUTO DIFF WBC: CPT

## 2023-09-23 ENCOUNTER — LAB REQUISITION (OUTPATIENT)
Dept: LAB | Facility: HOSPITAL | Age: 53
End: 2023-09-23
Payer: MEDICAID

## 2023-09-23 DIAGNOSIS — F64.1 DUAL ROLE TRANSVESTISM: ICD-10-CM

## 2023-09-23 LAB
ANION GAP SERPL CALCULATED.3IONS-SCNC: 11 MMOL/L (ref 5–15)
BASOPHILS # BLD AUTO: 0 10*3/MM3 (ref 0–0.2)
BASOPHILS NFR BLD AUTO: 0.5 % (ref 0–1.5)
BUN SERPL-MCNC: 14 MG/DL (ref 6–20)
BUN/CREAT SERPL: 16.1 (ref 7–25)
CALCIUM SPEC-SCNC: 8.7 MG/DL (ref 8.6–10.5)
CHLORIDE SERPL-SCNC: 100 MMOL/L (ref 98–107)
CO2 SERPL-SCNC: 26 MMOL/L (ref 22–29)
CREAT SERPL-MCNC: 0.87 MG/DL (ref 0.57–1)
DEPRECATED RDW RBC AUTO: 46.8 FL (ref 37–54)
EGFRCR SERPLBLD CKD-EPI 2021: 79.8 ML/MIN/1.73
EOSINOPHIL # BLD AUTO: 0.1 10*3/MM3 (ref 0–0.4)
EOSINOPHIL NFR BLD AUTO: 0.9 % (ref 0.3–6.2)
ERYTHROCYTE [DISTWIDTH] IN BLOOD BY AUTOMATED COUNT: 13.7 % (ref 12.3–15.4)
GLUCOSE SERPL-MCNC: 117 MG/DL (ref 65–99)
HCT VFR BLD AUTO: 35.3 % (ref 34–46.6)
HGB BLD-MCNC: 11.8 G/DL (ref 12–15.9)
LYMPHOCYTES # BLD AUTO: 2 10*3/MM3 (ref 0.7–3.1)
LYMPHOCYTES NFR BLD AUTO: 22.3 % (ref 19.6–45.3)
MCH RBC QN AUTO: 31.2 PG (ref 26.6–33)
MCHC RBC AUTO-ENTMCNC: 33.5 G/DL (ref 31.5–35.7)
MCV RBC AUTO: 93 FL (ref 79–97)
MONOCYTES # BLD AUTO: 0.6 10*3/MM3 (ref 0.1–0.9)
MONOCYTES NFR BLD AUTO: 6.2 % (ref 5–12)
NEUTROPHILS NFR BLD AUTO: 6.4 10*3/MM3 (ref 1.7–7)
NEUTROPHILS NFR BLD AUTO: 70.1 % (ref 42.7–76)
NRBC BLD AUTO-RTO: 0 /100 WBC (ref 0–0.2)
PLATELET # BLD AUTO: 150 10*3/MM3 (ref 140–450)
PMV BLD AUTO: 8.8 FL (ref 6–12)
POTASSIUM SERPL-SCNC: 3.7 MMOL/L (ref 3.5–5.2)
RBC # BLD AUTO: 3.8 10*6/MM3 (ref 3.77–5.28)
SODIUM SERPL-SCNC: 137 MMOL/L (ref 136–145)
WBC NRBC COR # BLD: 9.2 10*3/MM3 (ref 3.4–10.8)

## 2023-09-23 PROCEDURE — 80048 BASIC METABOLIC PNL TOTAL CA: CPT | Performed by: UROLOGY

## 2023-09-23 PROCEDURE — 85025 COMPLETE CBC W/AUTO DIFF WBC: CPT | Performed by: UROLOGY

## 2023-09-27 ENCOUNTER — TELEPHONE (OUTPATIENT)
Dept: PAIN MEDICINE | Facility: CLINIC | Age: 53
End: 2023-09-27

## 2023-09-27 NOTE — TELEPHONE ENCOUNTER
Caller: Geni Rea    Relationship to patient: Self    Best call back number: [phone]    Patient is needing: PATIENT HAS CONTACTED DIFFERENT PHARMACIES AND SHE HAS BEEN INFORMED THAT THEY DO NOT HAVE HYDROCODONE IN STOCK AT THIS TIME AND THEY DO NOT KNOW WHEN ARE THEY GOING TO HAVE MORE. PATIENT WOULD LIKE TO KNOW IF DR. PHAM CAN PRESCRIBE SOMETHING ELSE FOR HER SINCE SHE IS CURRENTLY OUT OF PAIN MEDICATION OR IF SAME MEDICATION COULD BE PRESCRIBED DIFFERENTLY. PLEASE ADVISE.

## 2023-09-28 DIAGNOSIS — M54.16 LUMBAR RADICULOPATHY: Primary | ICD-10-CM

## 2023-09-28 DIAGNOSIS — M51.36 DDD (DEGENERATIVE DISC DISEASE), LUMBAR: ICD-10-CM

## 2023-09-28 RX ORDER — MORPHINE SULFATE 15 MG/1
15 TABLET ORAL 3 TIMES DAILY PRN
Qty: 90 TABLET | Refills: 0 | Status: SHIPPED | OUTPATIENT
Start: 2023-09-28 | End: 2023-10-02 | Stop reason: SDUPTHER

## 2023-10-02 DIAGNOSIS — M54.16 LUMBAR RADICULOPATHY: ICD-10-CM

## 2023-10-02 DIAGNOSIS — M51.36 DDD (DEGENERATIVE DISC DISEASE), LUMBAR: ICD-10-CM

## 2023-10-02 RX ORDER — MORPHINE SULFATE 15 MG/1
15 TABLET ORAL 2 TIMES DAILY PRN
Qty: 28 TABLET | Refills: 0 | Status: SHIPPED | OUTPATIENT
Start: 2023-10-05

## 2023-10-02 NOTE — TELEPHONE ENCOUNTER
Patient informed   Patient insurance would only pay for 7 days then to 14 days, then 30  Patient needs new rx for those   Inspect in chart.

## 2023-10-02 NOTE — TELEPHONE ENCOUNTER
Hub staff attempted to follow warm transfer process and was unsuccessful     Caller: Geni Rea    Relationship to patient: Self    Best call back number: [phone]    Patient is needing: PATIENT CALLING TO DISCUSS RX MORPHINE 15 MG  INDICATING IT'S MAKING HER SLEEPY , STAYING SICK AT STOMACH.  THIS RX WAS A SUBSTITUTE FOR HYDROCODONE  WHICH COULD NOT BE FILLED BY RX AS THEY WERE OUT.    PLEASE CALL PATIENT TO DISCUSS

## 2023-10-02 NOTE — TELEPHONE ENCOUNTER
I sent the 14 day script.  She can then decide whether to continue or try and find hydrocodone again

## 2023-10-06 ENCOUNTER — TELEPHONE (OUTPATIENT)
Dept: PAIN MEDICINE | Facility: CLINIC | Age: 53
End: 2023-10-06
Payer: MEDICAID

## 2023-10-06 DIAGNOSIS — M51.36 DDD (DEGENERATIVE DISC DISEASE), LUMBAR: ICD-10-CM

## 2023-10-06 DIAGNOSIS — M54.16 LUMBAR RADICULOPATHY: Primary | ICD-10-CM

## 2023-10-06 RX ORDER — HYDROCODONE BITARTRATE AND ACETAMINOPHEN 7.5; 325 MG/1; MG/1
1 TABLET ORAL EVERY 8 HOURS PRN
Qty: 90 TABLET | Refills: 0 | Status: SHIPPED | OUTPATIENT
Start: 2023-10-06

## 2023-10-06 NOTE — TELEPHONE ENCOUNTER
Caller: Geni Rea    Relationship: Self    Best call back number: [phone]    Requested Prescriptions:   HYDROCODONE 7.5    Pharmacy where request should be sent:  [facility]    Last office visit with prescribing clinician: 9/11/2023   Last telemedicine visit with prescribing clinician: Visit date not found   Next office visit with prescribing clinician: 12/18/2023     Additional details provided by patient: PHARMACY DOESN'T HAVE HYDROCODONE 10 THEY DO HAVE HYDROCODONE 7.5- CAN WE CALL THAT IN    Does the patient have less than a 3 day supply:  [x] Yes  [] No    Would you like a call back once the refill request has been completed: [x] Yes [] No    If the office needs to give you a call back, can they leave a voicemail: [x] Yes [] No    Mohit Baker Rep   10/06/23 10:27 EDT

## 2023-10-10 ENCOUNTER — TELEPHONE (OUTPATIENT)
Dept: PAIN MEDICINE | Facility: CLINIC | Age: 53
End: 2023-10-10

## 2023-10-10 NOTE — TELEPHONE ENCOUNTER
Hub staff attempted to follow warm transfer process and was unsuccessful     Caller: Geni Rea    Relationship to patient: Self    Best call back number:     Patient is needing: MS REA IS CALLING BECAUSE HER PHARMACY TOLD HER  A PA IS NEEDED FOR HER HYDROcodone-acetaminophen (NORCO) 7.5-325 MG per tablet.     [id number] - [facility] [address] [place] [address] AT [place] [address] - [phone]  - [phone]  [phone]

## 2023-10-16 DIAGNOSIS — M51.36 DDD (DEGENERATIVE DISC DISEASE), LUMBAR: ICD-10-CM

## 2023-10-16 DIAGNOSIS — M54.16 LUMBAR RADICULOPATHY: ICD-10-CM

## 2023-10-16 RX ORDER — HYDROCODONE BITARTRATE AND ACETAMINOPHEN 7.5; 325 MG/1; MG/1
1 TABLET ORAL EVERY 8 HOURS PRN
Qty: 42 TABLET | Refills: 0 | Status: SHIPPED | OUTPATIENT
Start: 2023-10-16

## 2023-10-16 NOTE — TELEPHONE ENCOUNTER
Caller: Geni Rea    Relationship: Self    Best call back number: [phone] (home)     Requested Prescriptions:   Requested Prescriptions     Pending Prescriptions Disp Refills    HYDROcodone-acetaminophen (NORCO) 7.5-325 MG per tablet 90 tablet 0     Sig: Take 1 tablet by mouth Every 8 (Eight) Hours As Needed for Moderate Pain.        Pharmacy where request should be sent: [id number] - [place] [address] AT [facility] & [place] [address] - [phone] [facility] [phone] FX     Last office visit with prescribing clinician: 9/11/2023   Last telemedicine visit with prescribing clinician: Visit date not found   Next office visit with prescribing clinician: 12/18/2023     Additional details provided by patient: PATIENT STATED HER INSURANCE WILL ONLY ALLOW HER TO GET 7 DAYS, 7 DAYS, 14 DAYS AND THEN 30 DAYS SUPPLY OF MEDS. SHE IS ALMOST OUT OF THE FIRST 7 DAYS AND NEEDS A REFILL. SHE IS TAKING 3 PER DAY AND HAS TWO DAYS WORTH LEFT.   PLEASE ADVISE.   SHE STATED THAT SHE ISNT SURE THAT THE PHARMACY GOT THE PRIOR AUTH.     Does the patient have less than a 3 day supply:  [x] Yes  [] No        Mohit Vasquez Rep   10/16/23 13:40 EDT

## 2023-10-18 ENCOUNTER — PRIOR AUTHORIZATION (OUTPATIENT)
Dept: FAMILY MEDICINE CLINIC | Facility: CLINIC | Age: 53
End: 2023-10-18
Payer: MEDICAID

## 2023-10-18 NOTE — TELEPHONE ENCOUNTER
PA APPROVED    The request has been approved.     The authorization is effective from 10/18/2023 to 10/17/2024, as long as the member is enrolled in their current health plan. The request was approved as submitted. A written notification letter will follow with additional details.

## 2023-10-31 ENCOUNTER — TELEPHONE (OUTPATIENT)
Dept: FAMILY MEDICINE CLINIC | Facility: CLINIC | Age: 53
End: 2023-10-31

## 2023-11-01 ENCOUNTER — OFFICE VISIT (OUTPATIENT)
Dept: FAMILY MEDICINE CLINIC | Facility: CLINIC | Age: 53
End: 2023-11-01
Payer: MEDICAID

## 2023-11-01 VITALS
DIASTOLIC BLOOD PRESSURE: 87 MMHG | HEIGHT: 66 IN | WEIGHT: 156 LBS | OXYGEN SATURATION: 98 % | HEART RATE: 70 BPM | TEMPERATURE: 97.5 F | SYSTOLIC BLOOD PRESSURE: 138 MMHG | BODY MASS INDEX: 25.07 KG/M2

## 2023-11-01 DIAGNOSIS — R30.0 DYSURIA: Primary | ICD-10-CM

## 2023-11-01 DIAGNOSIS — N39.0 ACUTE UTI: ICD-10-CM

## 2023-11-01 LAB
BILIRUB BLD-MCNC: NEGATIVE MG/DL
CLARITY, POC: CLEAR
COLOR UR: YELLOW
EXPIRATION DATE: ABNORMAL
GLUCOSE UR STRIP-MCNC: NEGATIVE MG/DL
KETONES UR QL: NEGATIVE
LEUKOCYTE EST, POC: ABNORMAL
Lab: ABNORMAL
NITRITE UR-MCNC: NEGATIVE MG/ML
PH UR: 5.5 [PH] (ref 5–8)
PROT UR STRIP-MCNC: NEGATIVE MG/DL
RBC # UR STRIP: NEGATIVE /UL
SP GR UR: 1.01 (ref 1–1.03)
UROBILINOGEN UR QL: ABNORMAL

## 2023-11-01 PROCEDURE — 87186 SC STD MICRODIL/AGAR DIL: CPT | Performed by: NURSE PRACTITIONER

## 2023-11-01 PROCEDURE — 87077 CULTURE AEROBIC IDENTIFY: CPT | Performed by: NURSE PRACTITIONER

## 2023-11-01 PROCEDURE — 87086 URINE CULTURE/COLONY COUNT: CPT | Performed by: NURSE PRACTITIONER

## 2023-11-01 RX ORDER — CEFDINIR 300 MG/1
300 CAPSULE ORAL 2 TIMES DAILY
Qty: 14 CAPSULE | Refills: 0 | Status: SHIPPED | OUTPATIENT
Start: 2023-11-01

## 2023-11-01 RX ORDER — NICOTINE 4 MG/1
1 INHALANT RESPIRATORY (INHALATION) AS NEEDED
Qty: 12 EACH | Refills: 0 | Status: SHIPPED | OUTPATIENT
Start: 2023-11-01

## 2023-11-01 NOTE — PROGRESS NOTES
"Chief Complaint  Chief Complaint   Patient presents with    URI    Dysuria     SOS- 3 days ago, frequency, burning        Subjective          Genichelsea Rea is a 52-year-old female who presents to the office today with complaints of urinary issues.    Urinary issues- Noticed cloudiness 3 days ago. She has had an odor, burning, and itchiness when urination with urinary frequency. Denies any fever or chills. Denies taking any baths and wipes back to front.              Review of Systems   Constitutional:  Negative for chills and fever.   HENT:  Negative for congestion.    Respiratory:  Positive for shortness of breath.    Cardiovascular:  Negative for chest pain.   Gastrointestinal:  Negative for abdominal pain, nausea and vomiting.   Genitourinary:  Positive for difficulty urinating and frequency.        Burning on urination    Skin: Negative.         Objective   Vital Signs:   Vitals:    11/01/23 1529   BP: 138/87   Pulse: 70   Temp: 97.5 °F (36.4 °C)   SpO2: 98%      Estimated body mass index is 25.19 kg/m² as calculated from the following:    Height as of this encounter: 167.6 cm (65.98\").    Weight as of this encounter: 70.8 kg (156 lb).    BMI is >= 25 and <30. (Overweight) The following options were offered after discussion;: exercise counseling/recommendations and nutrition counseling/recommendations            \        Physical Exam  Vitals reviewed.   Constitutional:       Appearance: Normal appearance. She is normal weight.   HENT:      Head: Normocephalic and atraumatic.      Mouth/Throat:      Mouth: Mucous membranes are moist.      Pharynx: Oropharynx is clear.   Eyes:      Extraocular Movements: Extraocular movements intact.      Conjunctiva/sclera: Conjunctivae normal.   Cardiovascular:      Rate and Rhythm: Normal rate and regular rhythm.      Pulses: Normal pulses.      Heart sounds: Murmur heard.      Comments: S1, S2 audible  Pulmonary:      Effort: Pulmonary effort is normal.      Comments: On " room air, diminished breath sounds noted throughout all lobes   Abdominal:      General: Abdomen is flat. Bowel sounds are normal.      Palpations: Abdomen is soft.   Musculoskeletal:         General: Normal range of motion.      Cervical back: Normal range of motion.   Skin:     General: Skin is warm and dry.   Neurological:      General: No focal deficit present.      Mental Status: She is alert and oriented to person, place, and time. Mental status is at baseline.   Psychiatric:         Mood and Affect: Mood normal.         Behavior: Behavior normal.         Thought Content: Thought content normal.         Judgment: Judgment normal.                Physical Exam   Result Review :             Procedures       Assessment and Plan     Diagnoses and all orders for this visit:    1. Dysuria (Primary)  -     POC Urinalysis Dipstick, Automated    2. Acute UTI  Assessment & Plan:  Noticed cloudiness 3 days ago. She has had an odor, burning, and itchiness when urination with urinary frequency. Denies any fever or chills. Denies taking any baths and wipes back to front. Reports she had vulvoplasty September 2023     UA showed moderate leukocytes, urine sent for culture    Prescribed omnicef         Other orders  -     cefdinir (OMNICEF) 300 MG capsule; Take 1 capsule by mouth 2 (Two) Times a Day.  Dispense: 14 capsule; Refill: 0  -     nicotine (Nicotrol) 10 MG inhaler; Inhale 1 puff As Needed for Smoking Cessation.  Dispense: 12 each; Refill: 0              Follow Up   Return for Next scheduled follow up.   Patient was given instructions and counseling regarding her condition or for health maintenance advice. Please see specific information pulled into the AVS if appropriate.

## 2023-11-01 NOTE — ASSESSMENT & PLAN NOTE
Noticed cloudiness 3 days ago. She has had an odor, burning, and itchiness when urination with urinary frequency. Denies any fever or chills. Denies taking any baths and wipes back to front. Reports she had vulvoplasty September 2023     UA showed moderate leukocytes, urine sent for culture    Prescribed omnicef

## 2023-11-03 LAB — BACTERIA SPEC AEROBE CULT: ABNORMAL

## 2023-11-13 ENCOUNTER — TELEPHONE (OUTPATIENT)
Dept: PAIN MEDICINE | Facility: CLINIC | Age: 53
End: 2023-11-13
Payer: MEDICAID

## 2023-11-13 DIAGNOSIS — M51.36 DDD (DEGENERATIVE DISC DISEASE), LUMBAR: ICD-10-CM

## 2023-11-13 DIAGNOSIS — M54.16 LUMBAR RADICULOPATHY: ICD-10-CM

## 2023-11-13 RX ORDER — HYDROCODONE BITARTRATE AND ACETAMINOPHEN 10; 325 MG/1; MG/1
1 TABLET ORAL EVERY 8 HOURS PRN
Qty: 90 TABLET | Refills: 0 | Status: SHIPPED | OUTPATIENT
Start: 2023-11-22 | End: 2023-11-16 | Stop reason: SDUPTHER

## 2023-11-13 NOTE — TELEPHONE ENCOUNTER
Rx Refill Note  Requested Prescriptions     Pending Prescriptions Disp Refills    HYDROcodone-acetaminophen (NORCO)  MG per tablet 90 tablet 0     Sig: Take 1 tablet by mouth Every 8 (Eight) Hours As Needed for Moderate Pain.      Last office visit with prescribing clinician: 9/11/2023   Last telemedicine visit with prescribing clinician: Visit date not found   Next office visit with prescribing clinician: 12/18/2023                         Would you like a call back once the refill request has been completed: [] Yes [] No    If the office needs to give you a call back, can they leave a voicemail: [] Yes [] No    Lexy Mon MA  11/13/23, 13:15 EST

## 2023-11-13 NOTE — TELEPHONE ENCOUNTER
Caller: Geni Rea GIANA    Relationship: Self    Best call back number: [phone] (home)       Requested Prescriptions:   Requested Prescriptions     Pending Prescriptions Disp Refills    HYDROcodone-acetaminophen (NORCO)  MG per tablet 90 tablet 0     Sig: Take 1 tablet by mouth Every 8 (Eight) Hours As Needed for Moderate Pain.        Pharmacy where request should be sent: [id number] [place] [address] AT [place] [address] - [phone] [facility] [phone] FX     Last office visit with prescribing clinician: 9/11/2023   Last telemedicine visit with prescribing clinician: Visit date not found   Next office visit with prescribing clinician: 12/18/2023     Additional details provided by patient:     Does the patient have less than a 3 day supply:  [x] Yes  [] No    Would you like a call back once the refill request has been completed: [x] Yes [] No    If the office needs to give you a call back, can they leave a voicemail: [x] Yes [] No    Mohit Fonseca Rep   11/13/23 12:16 EST

## 2023-11-15 ENCOUNTER — TELEPHONE (OUTPATIENT)
Dept: PAIN MEDICINE | Facility: CLINIC | Age: 53
End: 2023-11-15
Payer: MEDICAID

## 2023-11-15 DIAGNOSIS — M54.16 LUMBAR RADICULOPATHY: ICD-10-CM

## 2023-11-15 DIAGNOSIS — M51.36 DDD (DEGENERATIVE DISC DISEASE), LUMBAR: ICD-10-CM

## 2023-11-15 NOTE — TELEPHONE ENCOUNTER
Caller: Geni Rea GIANA    Relationship: Self    Best call back number: [phone]    Requested Prescriptions: HYDROcodone-acetaminophen (NORCO)  MG per tablet   Requested Prescriptions      No prescriptions requested or ordered in this encounter        Pharmacy where request should be sent:  [facility]     Last office visit with prescribing clinician: 9/11/2023   Last telemedicine visit with prescribing clinician: Visit date not found   Next office visit with prescribing clinician: 12/18/2023     Additional details provided by patient: PATIENTS INSURANCE ONLY COVERED A 14 DAY SUPPLY SO SHE WILL BE OUT OF MEDICATION TODAY. THIS WAS DUE TO PHARMACY BEING OUT AND SWITCHING DOSAGES.      Does the patient have less than a 3 day supply:  [x] Yes  [] No    Would you like a call back once the refill request has been completed: [] Yes [x] No    If the office needs to give you a call back, can they leave a voicemail: [x] Yes [] No    Mohit Baer Rep   11/15/23 08:05 EST

## 2023-11-16 DIAGNOSIS — M51.36 DDD (DEGENERATIVE DISC DISEASE), LUMBAR: ICD-10-CM

## 2023-11-16 DIAGNOSIS — M54.16 LUMBAR RADICULOPATHY: ICD-10-CM

## 2023-11-16 RX ORDER — HYDROCODONE BITARTRATE AND ACETAMINOPHEN 10; 325 MG/1; MG/1
1 TABLET ORAL EVERY 8 HOURS PRN
Qty: 90 TABLET | Refills: 0 | Status: SHIPPED | OUTPATIENT
Start: 2023-11-22 | End: 2023-11-16 | Stop reason: SDUPTHER

## 2023-11-16 RX ORDER — HYDROCODONE BITARTRATE AND ACETAMINOPHEN 10; 325 MG/1; MG/1
1 TABLET ORAL EVERY 8 HOURS PRN
Qty: 90 TABLET | Refills: 0 | Status: SHIPPED | OUTPATIENT
Start: 2023-11-16

## 2023-11-16 NOTE — TELEPHONE ENCOUNTER
Caller: Geni Rea    Relationship: Self    Best call back number: [phone]    Requested Prescriptions:   Requested Prescriptions     Signed Prescriptions Disp Refills    HYDROcodone-acetaminophen (NORCO)  MG per tablet 90 tablet 0     Sig: Take 1 tablet by mouth Every 8 (Eight) Hours As Needed for Moderate Pain.     Authorizing Provider: LALIT PHAM      PATIENT STATES SHE IS COMPLETELY OUT OF MEDICATION BECAUSE LAST REFILL WAS ONLY 14 DAYS.  PHARMACY SAYS SCRIPT IS NOT DUE UNTIL 11/22/23.  PLEASE CALL PATIENT TO DISCUSS.  THANK YOU

## 2023-11-28 NOTE — PROGRESS NOTES
Chief Complaint  Chief Complaint   Patient presents with    Annual Exam        Subjective          Geni Rea is a 53-year-old female who presents to the office today for annual physical.    Annual physical- Denies any new family history. Smokes 1/2-1 PPD. She will try the gum. Drinks occassionally. Denies illegal drug use.     HLD-She has not been taking it because one of her doctors said she did not need this. Will have labs today.      GERD- Controlled on omeprazole.      Anxiety with depression- On medication. Feels like this time of year it gets worse, but is managed. She previously saw a counselor in past.      Osteoporosis/chronic pain- Uses a cane daily. On fosamax and compliant. Goes to specialist in pain care- Dr. Chang. Chronic pain is lower/back hips area. Currently pain is 9/10. A lot of walking makes pain worse. Pain management helps some.      Asthma/COPD- Compliant on inhalers. She reports she gets some shortness of breath.      Seasonal allergies- Does not take any medication.      Vitamin D defiency- Have been diagnosed- had labs that go back and forth     Transgender-Male to female transgender. On shots. She goes to [facility] for her hormone shots.         Labs- Due   Colonoscopy- Due   PSA- Due   DEXA- Due   Mammogram- Due         Vaccines:  Flu-Refused   Shingles- Check with insurance vaccine  Covid-19- Received 2 and does not want anymore    Dental exam- Up to date   Eye exam- Up to date          Review of Systems   Constitutional:  Negative for chills and fever.   HENT:  Negative for congestion.    Eyes: Negative.    Respiratory:  Positive for shortness of breath.    Cardiovascular:  Negative for chest pain.   Gastrointestinal:  Negative for abdominal pain, nausea and vomiting.   Genitourinary:  Negative for difficulty urinating.   Musculoskeletal:  Positive for myalgias.   Skin: Negative.    Allergic/Immunologic: Positive for environmental allergies.   Neurological:   "Positive for headache. Negative for dizziness and light-headedness.   Psychiatric/Behavioral:  Positive for depressed mood. Negative for self-injury and suicidal ideas. The patient is nervous/anxious.         Objective   Vital Signs:   Vitals:    11/29/23 0939   BP: 124/74   Pulse: 65   Temp: 97.5 °F (36.4 °C)   SpO2: 97%      Estimated body mass index is 26 kg/m² as calculated from the following:    Height as of this encounter: 167.6 cm (65.98\").    Weight as of this encounter: 73 kg (161 lb).                  Patient screened positive for depression based on a PHQ-9 score of 0 on 9/11/2023. Follow-up recommendations include: PCP managing depression.        Physical Exam  Vitals reviewed.   Constitutional:       Appearance: Normal appearance. She is normal weight.   HENT:      Head: Normocephalic and atraumatic.      Right Ear: Tympanic membrane normal.      Left Ear: Tympanic membrane normal.      Nose: Nose normal.      Mouth/Throat:      Mouth: Mucous membranes are moist.      Pharynx: Oropharynx is clear.   Eyes:      Extraocular Movements: Extraocular movements intact.      Conjunctiva/sclera: Conjunctivae normal.      Pupils: Pupils are equal, round, and reactive to light.      Comments: Wears glasses   Cardiovascular:      Rate and Rhythm: Normal rate and regular rhythm.      Pulses: Normal pulses.      Heart sounds: Normal heart sounds.      Comments: S1, S2 audible  Pulmonary:      Effort: Pulmonary effort is normal.      Comments: On room air , diminished breath sounds noted throughout all lobes  Abdominal:      General: Abdomen is flat. Bowel sounds are normal.      Palpations: Abdomen is soft.   Musculoskeletal:         General: Normal range of motion.      Cervical back: Normal range of motion.   Skin:     General: Skin is warm and dry.      Comments: Tattoos noted on arm   Neurological:      General: No focal deficit present.      Mental Status: She is alert and oriented to person, place, and time. " Mental status is at baseline.   Psychiatric:         Mood and Affect: Mood normal.         Behavior: Behavior normal.         Thought Content: Thought content normal.         Judgment: Judgment normal.                Physical Exam   Result Review :             Procedures       Assessment and Plan     Diagnoses and all orders for this visit:    1. Encounter for annual physical exam (Primary)  Assessment & Plan:  Annual physical- Denies any new family history. Smokes 1/2-1 PPD. She will try the gum. Drinks occassionally. Denies illegal drug use.     Labs- Due   Colonoscopy- Due   PSA- Due   DEXA- Due   Mammogram- Due         Vaccines:  Flu-Refused   Shingles- Check with insurance vaccine  Covid-19- Received 2 and does not want anymore    Dental exam- Up to date   Eye exam- Up to date     Encourage healthy diet and exercise  Check with insurance on where shingles vaccine covered  Encourage smoking cessation- nicotine gum prescribed   Check labs  Mammogram ordered  Cologuard ordered   DEX scan ordered     Orders:  -     DEXA Bone Density Axial  -     Mammo Screening Digital Tomosynthesis Bilateral With CAD; Future  -     Comprehensive Metabolic Panel  -     Hemoglobin A1c  -     Hepatitis C Antibody  -     Lipid Panel    2. Tobacco user  Assessment & Plan:  Geni Rea  reports that she has been smoking cigarettes. She has a 18.00 pack-year smoking history. She has been exposed to tobacco smoke. She has never used smokeless tobacco.. I have educated her on the risk of diseases from using tobacco products such as cancer, COPD, and heart disease.     I advised her to quit and she is willing to quit. We have discussed the following method/s for tobacco cessation:  Prescription Medicaiton.  Together we have set a quit date for 1 month from today.  She will follow up with me in 3 weeks or sooner to check on her progress.    I spent 3  minutes counseling the patient.    Prescribed nicotine gum     Smokes 1/2-1 PPD             3. Hyperlipidemia, unspecified hyperlipidemia type  Assessment & Plan:  She has not been taking it because one of her doctors said she did not need this. Will have labs today.     Check lipid panel       4. Gastroesophageal reflux disease without esophagitis  Assessment & Plan:  GERD- Controlled on omeprazole.       5. Anxiety  Assessment & Plan:  Anxiety with depression- On medication. Feels like this time of year it gets worse, but is managed. She previously saw a counselor in past.     On paxil and Vraylar  Denies SI or HI       6. Depressive disorder  Assessment & Plan:  Anxiety with depression- On medication. Feels like this time of year it gets worse, but is managed. She previously saw a counselor in past.   Denies SI or HI.    On paxil and Vraylar       7. Chronic obstructive pulmonary disease, unspecified COPD type  Assessment & Plan:  Asthma/COPD- Compliant on inhalers. She reports she gets some shortness of breath.     On albuterol and spiriva and symbicort     Sees pulmonology         8. Asthma, unspecified asthma severity, unspecified whether complicated, unspecified whether persistent  Assessment & Plan:  Asthma/COPD- Compliant on inhalers. She reports she gets some shortness of breath.      On albuterol and spiriva and symbicort      Sees pulmonology              9. Osteoporosis, unspecified osteoporosis type, unspecified pathological fracture presence  Assessment & Plan:  Osteoporosis/chronic pain- Uses a cane daily. On fosamax and compliant. Goes to specialist in pain care- Dr. Chang. Chronic pain is lower/back hips area. Currently pain is 9/10. A lot of walking makes pain worse. Pain management helps some.       10. Arthritis  Assessment & Plan:  Osteoporosis/chronic pain- Uses a cane daily. On fosamax and compliant. Goes to specialist in pain care- Dr. Chang. Chronic pain is lower/back hips area. Currently pain is 9/10. A lot of walking makes pain worse. Pain management helps some.     On  hydrocodone and robaxin and ibuprofen      11. Seasonal allergic rhinitis, unspecified trigger  Assessment & Plan:  Seasonal allergies- Does not take any medication.       12. Transsexualism  Assessment & Plan:  Transgender-Male to female transgender. On shots. She goes to [facility] for her hormone shots.       13. Vitamin D deficiency  Assessment & Plan:  Vitamin D defiency- Have been diagnosed- had labs that go back and forth    Check vitamin D level    Orders:  -     Vitamin D,25-Hydroxy    14. Colon cancer screening  -     Cologuard - Stool, Per Rectum; Future    15. Screening PSA (prostate specific antigen)  -     PSA Screen    Other orders  -     nicotine polacrilex (NICORETTE) 4 MG gum; Chew 1 each As Needed for Smoking Cessation.  Dispense: 100 each; Refill: 2              Follow Up   Return in about 6 months (around 5/29/2024) for Recheck- HLD , Recheck.   Patient was given instructions and counseling regarding her condition or for health maintenance advice. Please see specific information pulled into the AVS if appropriate.

## 2023-11-29 ENCOUNTER — OFFICE VISIT (OUTPATIENT)
Dept: FAMILY MEDICINE CLINIC | Facility: CLINIC | Age: 53
End: 2023-11-29
Payer: MEDICAID

## 2023-11-29 VITALS
BODY MASS INDEX: 25.88 KG/M2 | WEIGHT: 161 LBS | SYSTOLIC BLOOD PRESSURE: 124 MMHG | OXYGEN SATURATION: 97 % | DIASTOLIC BLOOD PRESSURE: 74 MMHG | TEMPERATURE: 97.5 F | HEIGHT: 66 IN | HEART RATE: 65 BPM

## 2023-11-29 DIAGNOSIS — E55.9 VITAMIN D DEFICIENCY: ICD-10-CM

## 2023-11-29 DIAGNOSIS — Z12.5 SCREENING PSA (PROSTATE SPECIFIC ANTIGEN): ICD-10-CM

## 2023-11-29 DIAGNOSIS — F32.A DEPRESSIVE DISORDER: ICD-10-CM

## 2023-11-29 DIAGNOSIS — Z00.00 ENCOUNTER FOR ANNUAL PHYSICAL EXAM: Primary | ICD-10-CM

## 2023-11-29 DIAGNOSIS — J45.909 ASTHMA, UNSPECIFIED ASTHMA SEVERITY, UNSPECIFIED WHETHER COMPLICATED, UNSPECIFIED WHETHER PERSISTENT: ICD-10-CM

## 2023-11-29 DIAGNOSIS — M19.90 ARTHRITIS: ICD-10-CM

## 2023-11-29 DIAGNOSIS — M81.0 OSTEOPOROSIS, UNSPECIFIED OSTEOPOROSIS TYPE, UNSPECIFIED PATHOLOGICAL FRACTURE PRESENCE: ICD-10-CM

## 2023-11-29 DIAGNOSIS — F64.0 TRANSSEXUALISM: ICD-10-CM

## 2023-11-29 DIAGNOSIS — Z12.11 COLON CANCER SCREENING: ICD-10-CM

## 2023-11-29 DIAGNOSIS — F41.9 ANXIETY: ICD-10-CM

## 2023-11-29 DIAGNOSIS — Z72.0 TOBACCO USER: ICD-10-CM

## 2023-11-29 DIAGNOSIS — J44.9 CHRONIC OBSTRUCTIVE PULMONARY DISEASE, UNSPECIFIED COPD TYPE: ICD-10-CM

## 2023-11-29 DIAGNOSIS — E78.5 HYPERLIPIDEMIA, UNSPECIFIED HYPERLIPIDEMIA TYPE: ICD-10-CM

## 2023-11-29 DIAGNOSIS — J30.2 SEASONAL ALLERGIC RHINITIS, UNSPECIFIED TRIGGER: ICD-10-CM

## 2023-11-29 DIAGNOSIS — K21.9 GASTROESOPHAGEAL REFLUX DISEASE WITHOUT ESOPHAGITIS: ICD-10-CM

## 2023-11-29 PROBLEM — N39.0 ACUTE UTI: Status: RESOLVED | Noted: 2023-11-01 | Resolved: 2023-11-29

## 2023-11-29 PROCEDURE — 83036 HEMOGLOBIN GLYCOSYLATED A1C: CPT | Performed by: NURSE PRACTITIONER

## 2023-11-29 PROCEDURE — 80061 LIPID PANEL: CPT | Performed by: NURSE PRACTITIONER

## 2023-11-29 PROCEDURE — 86803 HEPATITIS C AB TEST: CPT | Performed by: NURSE PRACTITIONER

## 2023-11-29 PROCEDURE — 80053 COMPREHEN METABOLIC PANEL: CPT | Performed by: NURSE PRACTITIONER

## 2023-11-29 PROCEDURE — 82306 VITAMIN D 25 HYDROXY: CPT | Performed by: NURSE PRACTITIONER

## 2023-11-29 PROCEDURE — G0103 PSA SCREENING: HCPCS | Performed by: NURSE PRACTITIONER

## 2023-11-29 NOTE — PATIENT INSTRUCTIONS
Encourage healthy diet and exercise  Check with insurance on where shingles vaccine covered  Encourage smoking cessation- nicotine gum prescribed   Check labs  Mammogram ordered  Cologuard ordered   DEX scan ordered

## 2023-11-29 NOTE — ASSESSMENT & PLAN NOTE
Osteoporosis/chronic pain- Uses a cane daily. On fosamax and compliant. Goes to specialist in pain care- Dr. Chang. Chronic pain is lower/back hips area. Currently pain is 9/10. A lot of walking makes pain worse. Pain management helps some.

## 2023-11-29 NOTE — ASSESSMENT & PLAN NOTE
Anxiety with depression- On medication. Feels like this time of year it gets worse, but is managed. She previously saw a counselor in past.   Denies SI or HI.    On paxil and Vraylar

## 2023-11-29 NOTE — ASSESSMENT & PLAN NOTE
Asthma/COPD- Compliant on inhalers. She reports she gets some shortness of breath.      On albuterol and spiriva and symbicort      Sees pulmonology

## 2023-11-29 NOTE — ASSESSMENT & PLAN NOTE
Annual physical- Denies any new family history. Smokes 1/2-1 PPD. She will try the gum. Drinks occassionally. Denies illegal drug use.     Labs- Due   Colonoscopy- Due   PSA- Due   DEXA- Due   Mammogram- Due         Vaccines:  Flu-Refused   Shingles- Check with insurance vaccine  Covid-19- Received 2 and does not want anymore    Dental exam- Up to date   Eye exam- Up to date     Encourage healthy diet and exercise  Check with insurance on where shingles vaccine covered  Encourage smoking cessation- nicotine gum prescribed   Check labs  Mammogram ordered  Cologuard ordered   DEX scan ordered

## 2023-11-29 NOTE — ASSESSMENT & PLAN NOTE
Transgender-Male to female transgender. On shots. She goes to [facility] for her hormone shots.

## 2023-11-29 NOTE — ASSESSMENT & PLAN NOTE
Geni Rea  reports that she has been smoking cigarettes. She has a 18.00 pack-year smoking history. She has been exposed to tobacco smoke. She has never used smokeless tobacco.. I have educated her on the risk of diseases from using tobacco products such as cancer, COPD, and heart disease.     I advised her to quit and she is willing to quit. We have discussed the following method/s for tobacco cessation:  Prescription Medicaiton.  Together we have set a quit date for 1 month from today.  She will follow up with me in 3 weeks or sooner to check on her progress.    I spent 3  minutes counseling the patient.    Prescribed nicotine gum     Smokes 1/2-1 PPD

## 2023-11-29 NOTE — ASSESSMENT & PLAN NOTE
Osteoporosis/chronic pain- Uses a cane daily. On fosamax and compliant. Goes to specialist in pain care- Dr. Chang. Chronic pain is lower/back hips area. Currently pain is 9/10. A lot of walking makes pain worse. Pain management helps some.     On hydrocodone and robaxin and ibuprofen

## 2023-11-29 NOTE — ASSESSMENT & PLAN NOTE
Anxiety with depression- On medication. Feels like this time of year it gets worse, but is managed. She previously saw a counselor in past.     On paxil and Vraylar  Denies SI or HI

## 2023-11-29 NOTE — PROGRESS NOTES
Venipuncture performed in right arm by Florence Oliver MA with good hemostasis. Patient tolerated well. Flroence Oliver MA 04/14/23

## 2023-11-30 LAB
25(OH)D3 SERPL-MCNC: 23.5 NG/ML (ref 30–100)
ALBUMIN SERPL-MCNC: 4.7 G/DL (ref 3.5–5.2)
ALBUMIN/GLOB SERPL: 2.2 G/DL
ALP SERPL-CCNC: 81 U/L (ref 39–117)
ALT SERPL W P-5'-P-CCNC: 25 U/L (ref 1–33)
ANION GAP SERPL CALCULATED.3IONS-SCNC: 10 MMOL/L (ref 5–15)
AST SERPL-CCNC: 14 U/L (ref 1–32)
BILIRUB SERPL-MCNC: 0.2 MG/DL (ref 0–1.2)
BUN SERPL-MCNC: 10 MG/DL (ref 6–20)
BUN/CREAT SERPL: 11.1 (ref 7–25)
CALCIUM SPEC-SCNC: 9.4 MG/DL (ref 8.6–10.5)
CHLORIDE SERPL-SCNC: 104 MMOL/L (ref 98–107)
CHOLEST SERPL-MCNC: 239 MG/DL (ref 0–200)
CO2 SERPL-SCNC: 28 MMOL/L (ref 22–29)
CREAT SERPL-MCNC: 0.9 MG/DL (ref 0.57–1)
EGFRCR SERPLBLD CKD-EPI 2021: 76.1 ML/MIN/1.73
GLOBULIN UR ELPH-MCNC: 2.1 GM/DL
GLUCOSE SERPL-MCNC: 87 MG/DL (ref 65–99)
HBA1C MFR BLD: 5.4 % (ref 4.8–5.6)
HCV AB SER DONR QL: NORMAL
HDLC SERPL-MCNC: 55 MG/DL (ref 40–60)
LDLC SERPL CALC-MCNC: 155 MG/DL (ref 0–100)
LDLC/HDLC SERPL: 2.77 {RATIO}
POTASSIUM SERPL-SCNC: 4.7 MMOL/L (ref 3.5–5.2)
PROT SERPL-MCNC: 6.8 G/DL (ref 6–8.5)
PSA SERPL-MCNC: 0.01 NG/ML (ref 0–4)
SODIUM SERPL-SCNC: 142 MMOL/L (ref 136–145)
TRIGL SERPL-MCNC: 159 MG/DL (ref 0–150)
VLDLC SERPL-MCNC: 29 MG/DL (ref 5–40)

## 2023-11-30 RX ORDER — ERGOCALCIFEROL 1.25 MG/1
50000 CAPSULE ORAL WEEKLY
Qty: 5 CAPSULE | Refills: 9 | Status: SHIPPED | OUTPATIENT
Start: 2023-11-30

## 2023-12-18 ENCOUNTER — TELEPHONE (OUTPATIENT)
Dept: PAIN MEDICINE | Facility: CLINIC | Age: 53
End: 2023-12-18
Payer: MEDICAID

## 2023-12-18 DIAGNOSIS — M51.36 DDD (DEGENERATIVE DISC DISEASE), LUMBAR: ICD-10-CM

## 2023-12-18 DIAGNOSIS — M54.16 LUMBAR RADICULOPATHY: ICD-10-CM

## 2023-12-18 RX ORDER — HYDROCODONE BITARTRATE AND ACETAMINOPHEN 10; 325 MG/1; MG/1
1 TABLET ORAL EVERY 8 HOURS PRN
Qty: 90 TABLET | Refills: 0 | Status: SHIPPED | OUTPATIENT
Start: 2023-12-18

## 2023-12-18 NOTE — TELEPHONE ENCOUNTER
Provider: VERO    Caller: PATIENT    Pharmacy: [facility] [place]    Phone Number: [phone]    Reason for Call: PATIENT STATES THAT HER PHARMACY IS OUT OF NORCO 7.5 MG AND 10 MG. SHE IS ASKING IF DR. PHAM CAN SEND IN A DIFFERENT MEDICATION FOR HER. SHE STATES THAT THE MORPHINE HE SENT IN FOR HER PREVIOUSLY DIDN'T AGREE WITH HER. PATIENT STATES SHE IS OUT OF MEDICATION. PLEASE CALL HER TO DISCUSS.

## 2023-12-20 ENCOUNTER — OFFICE VISIT (OUTPATIENT)
Dept: FAMILY MEDICINE CLINIC | Facility: CLINIC | Age: 53
End: 2023-12-20
Payer: MEDICAID

## 2023-12-20 VITALS
OXYGEN SATURATION: 98 % | TEMPERATURE: 97.3 F | WEIGHT: 161 LBS | BODY MASS INDEX: 25.88 KG/M2 | DIASTOLIC BLOOD PRESSURE: 74 MMHG | SYSTOLIC BLOOD PRESSURE: 117 MMHG | HEART RATE: 69 BPM | HEIGHT: 66 IN

## 2023-12-20 DIAGNOSIS — J06.9 UPPER RESPIRATORY TRACT INFECTION, UNSPECIFIED TYPE: Primary | ICD-10-CM

## 2023-12-20 LAB
EXPIRATION DATE: NORMAL
EXPIRATION DATE: NORMAL
FLUAV AG UPPER RESP QL IA.RAPID: NOT DETECTED
FLUBV AG UPPER RESP QL IA.RAPID: NOT DETECTED
INTERNAL CONTROL: NORMAL
INTERNAL CONTROL: NORMAL
Lab: NORMAL
Lab: NORMAL
S PYO AG THROAT QL: NEGATIVE
SARS-COV-2 AG UPPER RESP QL IA.RAPID: NOT DETECTED

## 2023-12-20 PROCEDURE — 87880 STREP A ASSAY W/OPTIC: CPT | Performed by: NURSE PRACTITIONER

## 2023-12-20 PROCEDURE — 99213 OFFICE O/P EST LOW 20 MIN: CPT | Performed by: NURSE PRACTITIONER

## 2023-12-20 PROCEDURE — 87428 SARSCOV & INF VIR A&B AG IA: CPT | Performed by: NURSE PRACTITIONER

## 2023-12-20 RX ORDER — PREDNISONE 20 MG/1
40 TABLET ORAL DAILY
Qty: 10 TABLET | Refills: 0 | Status: SHIPPED | OUTPATIENT
Start: 2023-12-20

## 2023-12-20 RX ORDER — AZITHROMYCIN 500 MG/1
500 TABLET, FILM COATED ORAL DAILY
Qty: 5 TABLET | Refills: 0 | Status: SHIPPED | OUTPATIENT
Start: 2023-12-20

## 2023-12-20 RX ORDER — BENZONATATE 100 MG/1
100 CAPSULE ORAL 3 TIMES DAILY PRN
Qty: 30 CAPSULE | Refills: 0 | Status: SHIPPED | OUTPATIENT
Start: 2023-12-20

## 2023-12-20 NOTE — ASSESSMENT & PLAN NOTE
Cough and congestion- Started around Sunday night. He was around his nephew who was sick. She has had a fever. She has had a coughing up green sputum. She has had headache and body aches. She has tried alkaselzer and mucinex at home to try to help.     COVID/Flu negative    Prescribed steroid, tesslon perles, azithromycin  Encourage hydration   Continue mucinex

## 2023-12-20 NOTE — PROGRESS NOTES
"Chief Complaint  Chief Complaint   Patient presents with    Fever    Cough    URI    Sore Throat        Subjective          Geni Rea is a 53-year-old female who presents to the office today due to cough and congestion.      Cough and congestion- Started around Sunday night. He was around his nephew who was sick. She has had a fever. She has had a coughing up green sputum. She has had headache and body aches. She has tried alkaselzer and mucinex at home to try to help.          Review of Systems   Constitutional:  Positive for chills and fever.   HENT:  Positive for congestion and sore throat. Negative for ear pain.    Respiratory:  Positive for cough and shortness of breath.    Cardiovascular:  Positive for chest pain.   Gastrointestinal:  Negative for abdominal pain, nausea and vomiting.   Genitourinary:  Negative for difficulty urinating.   Musculoskeletal:  Positive for myalgias.   Skin: Negative.    Neurological:  Positive for headache. Negative for dizziness and light-headedness.        Objective   Vital Signs:   Vitals:    12/20/23 1536   BP: 117/74   Pulse: 69   Temp: 97.3 °F (36.3 °C)   SpO2: 98%      Estimated body mass index is 26 kg/m² as calculated from the following:    Height as of this encounter: 167.6 cm (65.98\").    Weight as of this encounter: 73 kg (161 lb).                          Physical Exam  Vitals reviewed.   Constitutional:       Appearance: She is normal weight. She is ill-appearing.   HENT:      Head: Normocephalic and atraumatic.      Ears:      Comments: Slight erythema noted bilaterally      Nose: Nose normal.      Mouth/Throat:      Mouth: Mucous membranes are moist.      Pharynx: Oropharynx is clear. Posterior oropharyngeal erythema present.   Eyes:      Extraocular Movements: Extraocular movements intact.      Conjunctiva/sclera: Conjunctivae normal.      Pupils: Pupils are equal, round, and reactive to light.   Cardiovascular:      Rate and Rhythm: Normal rate and regular " rhythm.      Pulses: Normal pulses.      Heart sounds: Normal heart sounds.      Comments: S1, S2 audible  Pulmonary:      Effort: Pulmonary effort is normal.      Comments: Diminished breath sounds noted throughout all lobes   On room air   Abdominal:      General: Abdomen is flat. Bowel sounds are normal.      Palpations: Abdomen is soft.   Musculoskeletal:         General: Normal range of motion.      Cervical back: Normal range of motion.   Skin:     General: Skin is warm and dry.   Neurological:      General: No focal deficit present.      Mental Status: She is alert and oriented to person, place, and time. Mental status is at baseline.   Psychiatric:         Mood and Affect: Mood normal.         Behavior: Behavior normal.         Thought Content: Thought content normal.         Judgment: Judgment normal.                Physical Exam   Result Review :             Procedures       Assessment and Plan     Diagnoses and all orders for this visit:    1. Upper respiratory tract infection, unspecified type (Primary)  Assessment & Plan:  Cough and congestion- Started around Sunday night. He was around his nephew who was sick. She has had a fever. She has had a coughing up green sputum. She has had headache and body aches. She has tried alkaselzer and mucinex at home to try to help.     COVID/Flu negative    Prescribed steroid, tesslon perles, azithromycin  Encourage hydration   Continue mucinex     Orders:  -     POC Rapid Strep A  -     POCT SARS-CoV-2 + Flu Antigen ANATOLY    Other orders  -     azithromycin (Zithromax) 500 MG tablet; Take 1 tablet by mouth Daily.  Dispense: 5 tablet; Refill: 0  -     benzonatate (Tessalon Perles) 100 MG capsule; Take 1 capsule by mouth 3 (Three) Times a Day As Needed for Cough.  Dispense: 30 capsule; Refill: 0  -     predniSONE (DELTASONE) 20 MG tablet; Take 2 tablets by mouth Daily.  Dispense: 10 tablet; Refill: 0              Follow Up   Return for Next scheduled follow up.   Patient  was given instructions and counseling regarding her condition or for health maintenance advice. Please see specific information pulled into the AVS if appropriate.

## 2023-12-27 ENCOUNTER — OFFICE VISIT (OUTPATIENT)
Dept: PAIN MEDICINE | Facility: CLINIC | Age: 53
End: 2023-12-27
Payer: MEDICAID

## 2023-12-27 VITALS
OXYGEN SATURATION: 97 % | RESPIRATION RATE: 16 BRPM | HEART RATE: 70 BPM | BODY MASS INDEX: 26 KG/M2 | DIASTOLIC BLOOD PRESSURE: 69 MMHG | SYSTOLIC BLOOD PRESSURE: 124 MMHG | WEIGHT: 161 LBS

## 2023-12-27 DIAGNOSIS — M51.36 DDD (DEGENERATIVE DISC DISEASE), LUMBAR: ICD-10-CM

## 2023-12-27 DIAGNOSIS — M54.16 LUMBAR RADICULOPATHY: ICD-10-CM

## 2023-12-27 RX ORDER — HYDROCODONE BITARTRATE AND ACETAMINOPHEN 10; 325 MG/1; MG/1
1 TABLET ORAL EVERY 8 HOURS PRN
Qty: 90 TABLET | Refills: 0 | Status: SHIPPED | OUTPATIENT
Start: 2024-03-15

## 2023-12-27 RX ORDER — HYDROCODONE BITARTRATE AND ACETAMINOPHEN 10; 325 MG/1; MG/1
1 TABLET ORAL EVERY 8 HOURS PRN
Qty: 90 TABLET | Refills: 0 | Status: SHIPPED | OUTPATIENT
Start: 2024-02-16

## 2023-12-27 RX ORDER — HYDROCODONE BITARTRATE AND ACETAMINOPHEN 10; 325 MG/1; MG/1
1 TABLET ORAL EVERY 8 HOURS PRN
Qty: 90 TABLET | Refills: 0 | Status: SHIPPED | OUTPATIENT
Start: 2024-01-17

## 2023-12-27 NOTE — PROGRESS NOTES
CHIEF COMPLAINT  Chief Complaint   Patient presents with    Back Pain     3 month f/u  CC  Lumbar pain Treated w/Hydrocodone LD @ 10:30 am 12/27       Primary Care  Jaylyn Moscoso, LEONOR Sargent   Geni Rea is a 53 y.o. female  who presents to obtain narcotics.  She states that she has been taking narcotics for many years for chronic pain.  She reports she has a significant history of osteoarthritis as well as facet arthropathy and bilateral hip pain.  She reports she has tried physical therapy and interventions in the past which were not effective.  She gets minimal benefit with narcotics.  She has no imaging.  She recently moved and is transferring care.    History of Present Illness     Location: Low back, hips  Onset: Years ago  Duration: Worsening  Timing: Constant throughout the day  Quality: Sharp, stabbing  Severity: Today: 9       Last Week: 9       Worst: 10  Modifying Factors: The pain is worse with movement and physical activity and slightly improved with narcotics  Functional Deficit: The pain makes it difficult for her to work perform her activities of daily living    Physical Therapy: no    Interval Update 12/27/2023: Unchanged.  Gets minimal benefit with hydrocodone.  Plain films show minimal degenerative change with some scoliosis.  Continues to complain of very severe pain    The following portions of the patient's history were reviewed and updated as appropriate: allergies, current medications, past family history, past medical history, past social history, past surgical history and problem list.      Current Outpatient Medications:     albuterol (PROVENTIL) 2.5 MG/0.5ML nebulizer solution, Take 2.5 mg by nebulization 2 (Two) Times a Day As Needed for Wheezing or Shortness of Air.  2x dayprn, Disp: 3 mL, Rfl: 2    albuterol sulfate  (90 Base) MCG/ACT inhaler, Inhale 2 puffs Every 4 (Four) Hours As Needed for Wheezing., Disp: 18 g, Rfl: 3    alendronate (FOSAMAX) 70 MG tablet,  Take 1 tablet by mouth Every 7 (Seven) Days., Disp: 4 tablet, Rfl: 11    atorvastatin (LIPITOR) 40 MG tablet, Take 1 tablet by mouth Daily., Disp: 90 tablet, Rfl: 3    azithromycin (Zithromax) 500 MG tablet, Take 1 tablet by mouth Daily., Disp: 5 tablet, Rfl: 0    benzonatate (Tessalon Perles) 100 MG capsule, Take 1 capsule by mouth 3 (Three) Times a Day As Needed for Cough., Disp: 30 capsule, Rfl: 0    budesonide-formoterol (SYMBICORT) 160-4.5 MCG/ACT inhaler, Inhale 2 puffs 2 (Two) Times a Day., Disp: , Rfl:     Cariprazine HCl (Vraylar) 1.5 MG capsule capsule, Take 1 capsule by mouth Daily., Disp: 90 capsule, Rfl: 2    cefdinir (OMNICEF) 300 MG capsule, Take 1 capsule by mouth 2 (Two) Times a Day., Disp: 14 capsule, Rfl: 0    estradiol valerate (DELESTROGEN) 20 MG/ML injection, Inject 0.25 mL into the appropriate muscle as directed by prescriber., Disp: , Rfl:     [START ON 3/15/2024] HYDROcodone-acetaminophen (NORCO)  MG per tablet, Take 1 tablet by mouth Every 8 (Eight) Hours As Needed for Moderate Pain., Disp: 90 tablet, Rfl: 0    [START ON 2/16/2024] HYDROcodone-acetaminophen (NORCO)  MG per tablet, Take 1 tablet by mouth Every 8 (Eight) Hours As Needed for Moderate Pain., Disp: 90 tablet, Rfl: 0    ibuprofen (ADVIL,MOTRIN) 800 MG tablet, Take 1 tablet by mouth Every 12 (Twelve) Hours., Disp: 90 tablet, Rfl: 2    methocarbamol (ROBAXIN) 750 MG tablet, Take 1 tablet by mouth 4 (Four) Times a Day As Needed for Muscle Spasms., Disp: 90 tablet, Rfl: 2    nicotine polacrilex (NICORETTE) 4 MG gum, Chew 1 each As Needed for Smoking Cessation., Disp: 100 each, Rfl: 2    omeprazole (priLOSEC) 40 MG capsule, Take 1 capsule by mouth Daily., Disp: 90 capsule, Rfl: 3    PARoxetine (PAXIL) 30 MG tablet, Take 1 tablet by mouth every night at bedtime., Disp: 90 tablet, Rfl: 2    predniSONE (DELTASONE) 20 MG tablet, Take 2 tablets by mouth Daily., Disp: 10 tablet, Rfl: 0    tiotropium (SPIRIVA) 18 MCG per  inhalation capsule, Place 1 capsule into inhaler and inhale Daily., Disp: , Rfl:     vitamin D (ERGOCALCIFEROL) 1.25 MG (61674 UT) capsule capsule, Take 1 capsule by mouth 1 (One) Time Per Week., Disp: 5 capsule, Rfl: 9    [START ON 1/17/2024] HYDROcodone-acetaminophen (NORCO)  MG per tablet, Take 1 tablet by mouth Every 8 (Eight) Hours As Needed for Moderate Pain., Disp: 90 tablet, Rfl: 0    Review of Systems   Musculoskeletal:  Positive for arthralgias, back pain, gait problem, joint swelling and myalgias. Negative for neck pain and neck stiffness.   Neurological:  Negative for weakness and numbness.       Vitals:    12/27/23 1511   BP: 124/69   BP Location: Right arm   Patient Position: Sitting   Cuff Size: Adult   Pulse: 70   Resp: 16   SpO2: 97%   Weight: 73 kg (161 lb)   PainSc:   9   PainLoc: Back       Urine Drug Screen: 6/5/2023  Appropriate: Negative for metabolites  Objective   Physical Exam  Vitals and nursing note reviewed.   Constitutional:       General: She is not in acute distress.     Appearance: Normal appearance. She is normal weight.   Musculoskeletal:      Lumbar back: Tenderness present. Decreased range of motion.   Neurological:      Mental Status: She is alert.           Assessment & Plan   Problems Addressed this Visit    None  Visit Diagnoses       Lumbar radiculopathy        Relevant Medications    HYDROcodone-acetaminophen (NORCO)  MG per tablet (Start on 1/17/2024)    HYDROcodone-acetaminophen (NORCO)  MG per tablet (Start on 3/15/2024)    HYDROcodone-acetaminophen (NORCO)  MG per tablet (Start on 2/16/2024)    DDD (degenerative disc disease), lumbar        Relevant Medications    HYDROcodone-acetaminophen (NORCO)  MG per tablet (Start on 1/17/2024)    HYDROcodone-acetaminophen (NORCO)  MG per tablet (Start on 3/15/2024)    HYDROcodone-acetaminophen (NORCO)  MG per tablet (Start on 2/16/2024)          Diagnoses         Codes Comments    Lumbar  radiculopathy     ICD-10-CM: M54.16  ICD-9-CM: 724.4     DDD (degenerative disc disease), lumbar     ICD-10-CM: M51.36  ICD-9-CM: 722.52             Plan:  Continue hydrocodone 10 mg 3 times daily.  UDS borderline.  Inspect appropriate  --- Follow-up 3 months           INSPECT REPORT    As part of the patient's treatment plan, I may be prescribing controlled substances. The patient has been made aware of appropriate use of such medications, including potential risk of somnolence, limited ability to drive and/or work safely, and the potential for dependence or overdose. It has also bee made clear that these medications are for use by this patient only, without concomitant use of alcohol or other substances unless prescribed.     Patient has completed prescribing agreement detailing terms of continued prescribing of controlled substances, including monitoring EMMA reports, urine drug screening, and pill counts if necessary. The patient is aware that inappropriate use will results in cessation of prescribing such medications.    INSPECT report has been reviewed and scanned into the patient's chart.    As the clinician, I personally reviewed the INSPECT from 12/26/2020.    History and physical exam exhibit continued safe and appropriate use of controlled substances.      EMR Dragon/Transcription disclaimer:   Much of this encounter note is an electronic transcription/translation of spoken language to printed text. The electronic translation of spoken language may permit erroneous, or at times, nonsensical words or phrases to be inadvertently transcribed; Although I have reviewed the note for such errors, some may still exist.

## 2024-01-10 ENCOUNTER — HOSPITAL ENCOUNTER (OUTPATIENT)
Dept: BONE DENSITY | Facility: HOSPITAL | Age: 54
Discharge: HOME | End: 2024-01-10
Payer: MEDICAID

## 2024-01-10 ENCOUNTER — HOSPITAL ENCOUNTER (OUTPATIENT)
Dept: MAMMOGRAPHY | Facility: HOSPITAL | Age: 54
Discharge: HOME | End: 2024-01-10
Payer: MEDICAID

## 2024-01-10 DIAGNOSIS — Z00.00 ENCOUNTER FOR ANNUAL PHYSICAL EXAM: ICD-10-CM

## 2024-01-10 PROCEDURE — 77080 DXA BONE DENSITY AXIAL: CPT

## 2024-01-10 PROCEDURE — 77067 SCR MAMMO BI INCL CAD: CPT

## 2024-01-10 PROCEDURE — 77063 BREAST TOMOSYNTHESIS BI: CPT

## 2024-01-30 RX ORDER — ALBUTEROL SULFATE 90 UG/1
2 AEROSOL, METERED RESPIRATORY (INHALATION) EVERY 4 HOURS PRN
Qty: 18 G | Refills: 3 | Status: SHIPPED | OUTPATIENT
Start: 2024-01-30

## 2024-02-02 ENCOUNTER — TRANSCRIBE ORDERS (OUTPATIENT)
Dept: LAB | Facility: HOSPITAL | Age: 54
End: 2024-02-02
Payer: MEDICAID

## 2024-02-02 ENCOUNTER — LAB (OUTPATIENT)
Dept: LAB | Facility: HOSPITAL | Age: 54
End: 2024-02-02
Payer: MEDICAID

## 2024-02-02 DIAGNOSIS — E55.9 VITAMIN D DEFICIENCY: ICD-10-CM

## 2024-02-02 DIAGNOSIS — E78.5 DYSLIPIDEMIA: ICD-10-CM

## 2024-02-02 DIAGNOSIS — Z78.9 NORMAL TISSUE: ICD-10-CM

## 2024-02-02 DIAGNOSIS — Z78.9 NORMAL TISSUE: Primary | ICD-10-CM

## 2024-02-02 LAB
25(OH)D3 SERPL-MCNC: 26.3 NG/ML (ref 30–100)
ALBUMIN SERPL-MCNC: 4.5 G/DL (ref 3.5–5.2)
ALBUMIN/GLOB SERPL: 1.9 G/DL
ALP SERPL-CCNC: 80 U/L (ref 39–117)
ALT SERPL W P-5'-P-CCNC: 15 U/L (ref 1–33)
ANION GAP SERPL CALCULATED.3IONS-SCNC: 11.1 MMOL/L (ref 5–15)
AST SERPL-CCNC: 13 U/L (ref 1–32)
BILIRUB SERPL-MCNC: 0.3 MG/DL (ref 0–1.2)
BUN SERPL-MCNC: 7 MG/DL (ref 6–20)
BUN/CREAT SERPL: 6.6 (ref 7–25)
CALCIUM SPEC-SCNC: 9.3 MG/DL (ref 8.6–10.5)
CHLORIDE SERPL-SCNC: 103 MMOL/L (ref 98–107)
CHOLEST SERPL-MCNC: 225 MG/DL (ref 0–200)
CO2 SERPL-SCNC: 25.9 MMOL/L (ref 22–29)
CREAT SERPL-MCNC: 1.06 MG/DL (ref 0.57–1)
EGFRCR SERPLBLD CKD-EPI 2021: 62.6 ML/MIN/1.73
ESTRADIOL SERPL HS-MCNC: 86.4 PG/ML
GLOBULIN UR ELPH-MCNC: 2.4 GM/DL
GLUCOSE SERPL-MCNC: 85 MG/DL (ref 65–99)
HDLC SERPL-MCNC: 53 MG/DL (ref 40–60)
LDLC SERPL CALC-MCNC: 146 MG/DL (ref 0–100)
LDLC/HDLC SERPL: 2.69 {RATIO}
POTASSIUM SERPL-SCNC: 4.7 MMOL/L (ref 3.5–5.2)
PROT SERPL-MCNC: 6.9 G/DL (ref 6–8.5)
SODIUM SERPL-SCNC: 140 MMOL/L (ref 136–145)
TRIGL SERPL-MCNC: 146 MG/DL (ref 0–150)
VLDLC SERPL-MCNC: 26 MG/DL (ref 5–40)

## 2024-02-02 PROCEDURE — 80061 LIPID PANEL: CPT

## 2024-02-02 PROCEDURE — 80053 COMPREHEN METABOLIC PANEL: CPT

## 2024-02-02 PROCEDURE — 36415 COLL VENOUS BLD VENIPUNCTURE: CPT

## 2024-02-02 PROCEDURE — 82306 VITAMIN D 25 HYDROXY: CPT

## 2024-02-02 PROCEDURE — 82670 ASSAY OF TOTAL ESTRADIOL: CPT

## 2024-03-12 RX ORDER — BUDESONIDE AND FORMOTEROL FUMARATE DIHYDRATE 160; 4.5 UG/1; UG/1
2 AEROSOL RESPIRATORY (INHALATION)
Qty: 10.2 G | Refills: 3 | Status: SHIPPED | OUTPATIENT
Start: 2024-03-12 | End: 2024-03-13

## 2024-03-12 RX ORDER — TIOTROPIUM BROMIDE 18 UG/1
1 CAPSULE ORAL; RESPIRATORY (INHALATION)
Qty: 90 CAPSULE | Refills: 3 | Status: SHIPPED | OUTPATIENT
Start: 2024-03-12

## 2024-03-12 NOTE — TELEPHONE ENCOUNTER
Rx Refill Note  Requested Prescriptions     Pending Prescriptions Disp Refills    budesonide-formoterol (SYMBICORT) 160-4.5 MCG/ACT inhaler       Sig: Inhale 2 puffs 2 (Two) Times a Day.    tiotropium (SPIRIVA) 18 MCG per inhalation capsule       Sig: Place 1 capsule into inhaler and inhale Daily.      Last office visit with prescribing clinician: 12/20/2023   Last telemedicine visit with prescribing clinician: Visit date not found   Next office visit with prescribing clinician: 5/31/2024        Lipid Panel (02/02/2024 10:36)                  Would you like a call back once the refill request has been completed: [] Yes [] No    If the office needs to give you a call back, can they leave a voicemail: [] Yes [] No    Susy Dunham, RT  03/12/24, 10:53 EDT

## 2024-03-13 ENCOUNTER — PRIOR AUTHORIZATION (OUTPATIENT)
Dept: FAMILY MEDICINE CLINIC | Facility: CLINIC | Age: 54
End: 2024-03-13
Payer: MEDICAID

## 2024-03-13 RX ORDER — BUDESONIDE AND FORMOTEROL FUMARATE DIHYDRATE 160; 4.5 UG/1; UG/1
2 AEROSOL RESPIRATORY (INHALATION)
Qty: 10.2 G | Refills: 2 | Status: SHIPPED | OUTPATIENT
Start: 2024-03-13

## 2024-03-13 NOTE — TELEPHONE ENCOUNTER
HUB to relay     Patients insurance prefers the brand name Symbicort.   Please resend for the brand name.     Information regarding your request  The brand name, Symbicort, is available without prior authorization. Please have the pharmacy process the claim for the brand Symbicort. If there is a clinical reason the patient cannot use the brand formulation (such as allergy to an ingredient found in the brand that is not in the generic) then please fax the appropriate PA request along with the confirming documentation directly to [facility] at [phone].

## 2024-03-26 RX ORDER — PAROXETINE 30 MG/1
30 TABLET, FILM COATED ORAL
Qty: 90 TABLET | Refills: 2 | Status: SHIPPED | OUTPATIENT
Start: 2024-03-26

## 2024-04-03 ENCOUNTER — OFFICE VISIT (OUTPATIENT)
Dept: PAIN MEDICINE | Facility: CLINIC | Age: 54
End: 2024-04-03
Payer: MEDICAID

## 2024-04-03 VITALS
DIASTOLIC BLOOD PRESSURE: 85 MMHG | BODY MASS INDEX: 26.39 KG/M2 | SYSTOLIC BLOOD PRESSURE: 129 MMHG | HEART RATE: 69 BPM | RESPIRATION RATE: 16 BRPM | OXYGEN SATURATION: 95 % | WEIGHT: 163.4 LBS

## 2024-04-03 DIAGNOSIS — Z79.899 HIGH RISK MEDICATION USE: Primary | ICD-10-CM

## 2024-04-03 DIAGNOSIS — M51.36 DDD (DEGENERATIVE DISC DISEASE), LUMBAR: ICD-10-CM

## 2024-04-03 DIAGNOSIS — M54.16 LUMBAR RADICULOPATHY: ICD-10-CM

## 2024-04-03 PROCEDURE — 1125F AMNT PAIN NOTED PAIN PRSNT: CPT | Performed by: STUDENT IN AN ORGANIZED HEALTH CARE EDUCATION/TRAINING PROGRAM

## 2024-04-03 PROCEDURE — 1160F RVW MEDS BY RX/DR IN RCRD: CPT | Performed by: STUDENT IN AN ORGANIZED HEALTH CARE EDUCATION/TRAINING PROGRAM

## 2024-04-03 PROCEDURE — 1159F MED LIST DOCD IN RCRD: CPT | Performed by: STUDENT IN AN ORGANIZED HEALTH CARE EDUCATION/TRAINING PROGRAM

## 2024-04-03 PROCEDURE — 99214 OFFICE O/P EST MOD 30 MIN: CPT | Performed by: STUDENT IN AN ORGANIZED HEALTH CARE EDUCATION/TRAINING PROGRAM

## 2024-04-03 RX ORDER — HYDROCODONE BITARTRATE AND ACETAMINOPHEN 10; 325 MG/1; MG/1
1 TABLET ORAL EVERY 8 HOURS PRN
Qty: 90 TABLET | Refills: 0 | Status: SHIPPED | OUTPATIENT
Start: 2024-06-14

## 2024-04-03 RX ORDER — HYDROCODONE BITARTRATE AND ACETAMINOPHEN 10; 325 MG/1; MG/1
1 TABLET ORAL EVERY 8 HOURS PRN
Qty: 90 TABLET | Refills: 0 | Status: SHIPPED | OUTPATIENT
Start: 2024-04-16

## 2024-04-03 RX ORDER — HYDROCODONE BITARTRATE AND ACETAMINOPHEN 10; 325 MG/1; MG/1
1 TABLET ORAL EVERY 8 HOURS PRN
Qty: 90 TABLET | Refills: 0 | Status: SHIPPED | OUTPATIENT
Start: 2024-05-15

## 2024-04-03 NOTE — PROGRESS NOTES
CHIEF COMPLAINT  Chief Complaint   Patient presents with    Back Pain     3 month f/u  CC  Lumbar pain Treated w/Hydrocodone LD @ 7 am 04/03       Primary Care  Jaylyn Moscoso, APRN    Subjective   Geni Rea is a 53 y.o. female  who presents to obtain narcotics.  She states that she has been taking narcotics for many years for chronic pain.  She reports she has a significant history of osteoarthritis as well as facet arthropathy and bilateral hip pain.  She reports she has tried physical therapy and interventions in the past which were not effective.  She gets minimal benefit with narcotics.  She has no imaging.  She recently moved and is transferring care.    History of Present Illness     Location: Low back, hips  Onset: Years ago  Duration: Worsening  Timing: Constant throughout the day  Quality: Sharp, stabbing  Severity: Today: 9       Last Week: 9       Worst: 10  Modifying Factors: The pain is worse with movement and physical activity and slightly improved with narcotics  Functional Deficit: The pain makes it difficult for her to work perform her activities of daily living    Physical Therapy: no    Interval Update 04/03/2024: Unchanged.  Continues to complain of nearly intractable pain.  Minimal benefit with hydrocodone    The following portions of the patient's history were reviewed and updated as appropriate: allergies, current medications, past family history, past medical history, past social history, past surgical history and problem list.      Current Outpatient Medications:     albuterol (PROVENTIL) 2.5 MG/0.5ML nebulizer solution, Take 2.5 mg by nebulization 2 (Two) Times a Day As Needed for Wheezing or Shortness of Air.  2x dayprn, Disp: 3 mL, Rfl: 2    albuterol sulfate  (90 Base) MCG/ACT inhaler, INHALE 2 PUFFS BY MOUTH EVERY 4 HOURS AS NEEDED FOR WHEEZING, Disp: 18 g, Rfl: 3    alendronate (FOSAMAX) 70 MG tablet, Take 1 tablet by mouth Every 7 (Seven) Days., Disp: 4 tablet, Rfl:  11    atorvastatin (LIPITOR) 40 MG tablet, Take 1 tablet by mouth Daily., Disp: 90 tablet, Rfl: 3    azithromycin (Zithromax) 500 MG tablet, Take 1 tablet by mouth Daily., Disp: 5 tablet, Rfl: 0    benzonatate (Tessalon Perles) 100 MG capsule, Take 1 capsule by mouth 3 (Three) Times a Day As Needed for Cough., Disp: 30 capsule, Rfl: 0    budesonide-formoterol (Symbicort) 160-4.5 MCG/ACT inhaler, Inhale 2 puffs 2 (Two) Times a Day., Disp: 10.2 g, Rfl: 2    Cariprazine HCl (Vraylar) 1.5 MG capsule capsule, Take 1 capsule by mouth Daily., Disp: 90 capsule, Rfl: 2    estradiol valerate (DELESTROGEN) 20 MG/ML injection, Inject 0.25 mL into the appropriate muscle as directed by prescriber., Disp: , Rfl:     [START ON 6/14/2024] HYDROcodone-acetaminophen (NORCO)  MG per tablet, Take 1 tablet by mouth Every 8 (Eight) Hours As Needed for Moderate Pain., Disp: 90 tablet, Rfl: 0    [START ON 5/15/2024] HYDROcodone-acetaminophen (NORCO)  MG per tablet, Take 1 tablet by mouth Every 8 (Eight) Hours As Needed for Moderate Pain., Disp: 90 tablet, Rfl: 0    [START ON 4/16/2024] HYDROcodone-acetaminophen (NORCO)  MG per tablet, Take 1 tablet by mouth Every 8 (Eight) Hours As Needed for Moderate Pain., Disp: 90 tablet, Rfl: 0    ibuprofen (ADVIL,MOTRIN) 800 MG tablet, Take 1 tablet by mouth Every 12 (Twelve) Hours., Disp: 90 tablet, Rfl: 2    methocarbamol (ROBAXIN) 750 MG tablet, Take 1 tablet by mouth 4 (Four) Times a Day As Needed for Muscle Spasms., Disp: 90 tablet, Rfl: 2    nicotine polacrilex (NICORETTE) 4 MG gum, Chew 1 each As Needed for Smoking Cessation., Disp: 100 each, Rfl: 2    omeprazole (priLOSEC) 40 MG capsule, Take 1 capsule by mouth Daily., Disp: 90 capsule, Rfl: 3    PARoxetine (PAXIL) 30 MG tablet, TAKE 1 TABLET BY MOUTH EVERY NIGHT AT BEDTIME, Disp: 90 tablet, Rfl: 2    tiotropium (SPIRIVA) 18 MCG per inhalation capsule, Place 1 capsule into inhaler and inhale Daily., Disp: 90 capsule, Rfl: 3     vitamin D (ERGOCALCIFEROL) 1.25 MG (86902 UT) capsule capsule, Take 1 capsule by mouth 1 (One) Time Per Week., Disp: 5 capsule, Rfl: 9    Review of Systems   Musculoskeletal:  Positive for arthralgias, back pain, gait problem, joint swelling and myalgias. Negative for neck pain and neck stiffness.   Neurological:  Negative for weakness and numbness.       Vitals:    04/03/24 1246   BP: 129/85   BP Location: Right arm   Patient Position: Sitting   Cuff Size: Adult   Pulse: 69   Resp: 16   SpO2: 95%   Weight: 74.1 kg (163 lb 6.4 oz)   PainSc:   9   PainLoc: Back       Urine Drug Screen: 4/3/2024  Appropriate: Pending  Objective   Physical Exam  Vitals and nursing note reviewed.   Constitutional:       General: She is not in acute distress.     Appearance: Normal appearance. She is normal weight.   Musculoskeletal:      Lumbar back: Tenderness present. Decreased range of motion.   Neurological:      Mental Status: She is alert.           Assessment & Plan   Problems Addressed this Visit    None  Visit Diagnoses       Lumbar radiculopathy        Relevant Medications    HYDROcodone-acetaminophen (NORCO)  MG per tablet (Start on 6/14/2024)    HYDROcodone-acetaminophen (NORCO)  MG per tablet (Start on 5/15/2024)    HYDROcodone-acetaminophen (NORCO)  MG per tablet (Start on 4/16/2024)    DDD (degenerative disc disease), lumbar        Relevant Medications    HYDROcodone-acetaminophen (NORCO)  MG per tablet (Start on 6/14/2024)    HYDROcodone-acetaminophen (NORCO)  MG per tablet (Start on 5/15/2024)    HYDROcodone-acetaminophen (NORCO)  MG per tablet (Start on 4/16/2024)          Diagnoses         Codes Comments    Lumbar radiculopathy     ICD-10-CM: M54.16  ICD-9-CM: 724.4     DDD (degenerative disc disease), lumbar     ICD-10-CM: M51.36  ICD-9-CM: 722.52             Plan:  Continue hydrocodone 10 mg 3 times daily.  Repeat UDS today.  Patient reports marijuana exposure however denies using  marijuana herself.  Very high risk.  --- Follow-up 3 months           INSPECT REPORT    As part of the patient's treatment plan, I may be prescribing controlled substances. The patient has been made aware of appropriate use of such medications, including potential risk of somnolence, limited ability to drive and/or work safely, and the potential for dependence or overdose. It has also bee made clear that these medications are for use by this patient only, without concomitant use of alcohol or other substances unless prescribed.     Patient has completed prescribing agreement detailing terms of continued prescribing of controlled substances, including monitoring EMMA reports, urine drug screening, and pill counts if necessary. The patient is aware that inappropriate use will results in cessation of prescribing such medications.    INSPECT report has been reviewed and scanned into the patient's chart.    As the clinician, I personally reviewed the INSPECT from 4/1/2024.    History and physical exam exhibit continued safe and appropriate use of controlled substances.      EMR Dragon/Transcription disclaimer:   Much of this encounter note is an electronic transcription/translation of spoken language to printed text. The electronic translation of spoken language may permit erroneous, or at times, nonsensical words or phrases to be inadvertently transcribed; Although I have reviewed the note for such errors, some may still exist.

## 2024-05-06 RX ORDER — ALENDRONATE SODIUM 70 MG/1
70 TABLET ORAL
Qty: 4 TABLET | Refills: 11 | Status: SHIPPED | OUTPATIENT
Start: 2024-05-06

## 2024-05-06 RX ORDER — OMEPRAZOLE 40 MG/1
40 CAPSULE, DELAYED RELEASE ORAL DAILY
Qty: 90 CAPSULE | Refills: 3 | Status: SHIPPED | OUTPATIENT
Start: 2024-05-06

## 2024-05-24 ENCOUNTER — OFFICE VISIT (OUTPATIENT)
Dept: FAMILY MEDICINE CLINIC | Facility: CLINIC | Age: 54
End: 2024-05-24
Payer: MEDICAID

## 2024-05-24 VITALS
RESPIRATION RATE: 14 BRPM | HEIGHT: 66 IN | DIASTOLIC BLOOD PRESSURE: 71 MMHG | HEART RATE: 71 BPM | WEIGHT: 158 LBS | OXYGEN SATURATION: 96 % | BODY MASS INDEX: 25.39 KG/M2 | TEMPERATURE: 98.2 F | SYSTOLIC BLOOD PRESSURE: 111 MMHG

## 2024-05-24 DIAGNOSIS — R82.90 CLOUDY URINE: Primary | ICD-10-CM

## 2024-05-24 DIAGNOSIS — N39.0 URINARY TRACT INFECTION WITHOUT HEMATURIA, SITE UNSPECIFIED: ICD-10-CM

## 2024-05-24 PROBLEM — J06.9 URI (UPPER RESPIRATORY INFECTION): Status: RESOLVED | Noted: 2023-12-20 | Resolved: 2024-05-24

## 2024-05-24 LAB
BILIRUB BLD-MCNC: NEGATIVE MG/DL
CLARITY, POC: CLEAR
COLOR UR: YELLOW
EXPIRATION DATE: ABNORMAL
GLUCOSE UR STRIP-MCNC: NEGATIVE MG/DL
KETONES UR QL: NEGATIVE
LEUKOCYTE EST, POC: ABNORMAL
Lab: ABNORMAL
NITRITE UR-MCNC: NEGATIVE MG/ML
PH UR: 6 [PH] (ref 5–8)
PROT UR STRIP-MCNC: NEGATIVE MG/DL
RBC # UR STRIP: NEGATIVE /UL
SP GR UR: 1.02 (ref 1–1.03)
UROBILINOGEN UR QL: NORMAL

## 2024-05-24 PROCEDURE — 87186 SC STD MICRODIL/AGAR DIL: CPT | Performed by: NURSE PRACTITIONER

## 2024-05-24 PROCEDURE — 1160F RVW MEDS BY RX/DR IN RCRD: CPT | Performed by: NURSE PRACTITIONER

## 2024-05-24 PROCEDURE — 1159F MED LIST DOCD IN RCRD: CPT | Performed by: NURSE PRACTITIONER

## 2024-05-24 PROCEDURE — 1125F AMNT PAIN NOTED PAIN PRSNT: CPT | Performed by: NURSE PRACTITIONER

## 2024-05-24 PROCEDURE — 99213 OFFICE O/P EST LOW 20 MIN: CPT | Performed by: NURSE PRACTITIONER

## 2024-05-24 PROCEDURE — 87086 URINE CULTURE/COLONY COUNT: CPT | Performed by: NURSE PRACTITIONER

## 2024-05-24 PROCEDURE — 87077 CULTURE AEROBIC IDENTIFY: CPT | Performed by: NURSE PRACTITIONER

## 2024-05-24 RX ORDER — CEFDINIR 300 MG/1
300 CAPSULE ORAL 2 TIMES DAILY
Qty: 14 CAPSULE | Refills: 0 | Status: SHIPPED | OUTPATIENT
Start: 2024-05-24

## 2024-05-24 RX ORDER — ESTRADIOL VALERATE 10 MG/ML
INJECTION INTRAMUSCULAR
COMMUNITY
Start: 2024-05-10

## 2024-05-24 RX ORDER — FLUCONAZOLE 150 MG/1
150 TABLET ORAL ONCE
Qty: 1 TABLET | Refills: 0 | Status: SHIPPED | OUTPATIENT
Start: 2024-05-24 | End: 2024-05-24

## 2024-05-24 NOTE — ASSESSMENT & PLAN NOTE
Urinary issues-Started 2-3 days ago. This has happened before. She reports she is having foul odor, itchy, and cloudiness. Denies fever, hematuria, flank pain.     UA reviewed- small leukocytes     Prescribed diflucan due to itching  Prescribed cefdinir

## 2024-05-24 NOTE — PROGRESS NOTES
"Chief Complaint  Chief Complaint   Patient presents with    cloudy urine     Odor to urine,cloudy,itchy x 2-3 days        Subjective          Geni Rea is a 53-year-old female who reports to the office today due to urinary issues.    Urinary issues-Started 2-3 days ago. This has happened before. She reports she is having foul odor, itchy, and cloudiness. Denies fever, hematuria, flank pain.          Review of Systems   Constitutional:  Negative for chills and fever.   HENT:  Negative for congestion.    Respiratory:  Negative for shortness of breath.    Cardiovascular:  Negative for chest pain.   Gastrointestinal:  Negative for abdominal pain, nausea and vomiting.   Genitourinary:  Positive for difficulty urinating. Negative for flank pain.   Musculoskeletal:  Negative for myalgias.   Skin: Negative.    Neurological:  Negative for dizziness, light-headedness and headache.        Objective   Vital Signs:   Vitals:    05/24/24 1457   BP: 111/71   Pulse: 71   Resp: 14   Temp: 98.2 °F (36.8 °C)   SpO2: 96%      Estimated body mass index is 25.52 kg/m² as calculated from the following:    Height as of this encounter: 167.6 cm (65.98\").    Weight as of this encounter: 71.7 kg (158 lb).                          Physical Exam  Vitals reviewed.   Constitutional:       Appearance: Normal appearance. She is normal weight.   HENT:      Head: Normocephalic and atraumatic.      Nose: Nose normal.      Mouth/Throat:      Mouth: Mucous membranes are moist.      Pharynx: Oropharynx is clear.   Eyes:      Extraocular Movements: Extraocular movements intact.      Conjunctiva/sclera: Conjunctivae normal.      Comments: Wears glasses    Cardiovascular:      Rate and Rhythm: Normal rate and regular rhythm.      Pulses: Normal pulses.      Heart sounds: Normal heart sounds.      Comments: S1, S2 audible  Pulmonary:      Effort: Pulmonary effort is normal.      Comments: On room air   Diminished breath sounds noted throughout all " lobes  Abdominal:      General: Abdomen is flat.   Musculoskeletal:         General: Normal range of motion.      Cervical back: Normal range of motion.      Comments: Uses cane    Skin:     General: Skin is warm and dry.   Neurological:      General: No focal deficit present.      Mental Status: She is alert and oriented to person, place, and time. Mental status is at baseline.   Psychiatric:         Mood and Affect: Mood normal.         Behavior: Behavior normal.         Thought Content: Thought content normal.         Judgment: Judgment normal.                Physical Exam   Result Review :             Procedures       Assessment and Plan     Diagnoses and all orders for this visit:    1. Cloudy urine (Primary)  -     POC Urinalysis Dipstick, Automated    2. Urinary tract infection without hematuria, site unspecified  Assessment & Plan:  Urinary issues-Started 2-3 days ago. This has happened before. She reports she is having foul odor, itchy, and cloudiness. Denies fever, hematuria, flank pain.     UA reviewed- small leukocytes     Prescribed diflucan due to itching  Prescribed cefdinir       Other orders  -     fluconazole (Diflucan) 150 MG tablet; Take 1 tablet by mouth 1 (One) Time for 1 dose.  Dispense: 1 tablet; Refill: 0  -     cefdinir (OMNICEF) 300 MG capsule; Take 1 capsule by mouth 2 (Two) Times a Day.  Dispense: 14 capsule; Refill: 0              Follow Up   Return for Next scheduled follow up.   Patient was given instructions and counseling regarding her condition or for health maintenance advice. Please see specific information pulled into the AVS if appropriate.

## 2024-05-26 LAB — BACTERIA SPEC AEROBE CULT: ABNORMAL
